# Patient Record
Sex: MALE | Race: WHITE | NOT HISPANIC OR LATINO | Employment: FULL TIME | URBAN - METROPOLITAN AREA
[De-identification: names, ages, dates, MRNs, and addresses within clinical notes are randomized per-mention and may not be internally consistent; named-entity substitution may affect disease eponyms.]

---

## 2019-10-10 ENCOUNTER — HOSPITAL ENCOUNTER (OUTPATIENT)
Dept: MEDSURG UNIT | Facility: HOSPITAL | Age: 49
End: 2019-10-11
Attending: FAMILY MEDICINE | Admitting: FAMILY MEDICINE

## 2019-10-10 LAB
ABS NEUT (OLG): 3.9 X10(3)/MCL (ref 1.5–6.9)
ALBUMIN SERPL-MCNC: 3.8 GM/DL (ref 3.4–5)
ALBUMIN/GLOB SERPL: 1.3 RATIO
ALP SERPL-CCNC: 72 UNIT/L (ref 30–113)
ALT SERPL-CCNC: 23 UNIT/L (ref 10–45)
AMPHET UR QL SCN: NORMAL
APTT PPP: 31 SECOND(S) (ref 25–35)
AST SERPL-CCNC: 20 UNIT/L (ref 15–37)
BARBITURATE SCN PRESENT UR: NORMAL
BASOPHILS # BLD AUTO: 0 X10(3)/MCL (ref 0–0.1)
BASOPHILS NFR BLD AUTO: 0 % (ref 0–1)
BENZODIAZ UR QL SCN: NORMAL
BILIRUB SERPL-MCNC: 0.5 MG/DL (ref 0.1–0.9)
BILIRUBIN DIRECT+TOT PNL SERPL-MCNC: 0.1 MG/DL (ref 0–0.3)
BILIRUBIN DIRECT+TOT PNL SERPL-MCNC: 0.4 MG/DL
BUN SERPL-MCNC: 15 MG/DL (ref 10–20)
CALCIUM SERPL-MCNC: 8.4 MG/DL (ref 8–10.5)
CANNABINOIDS UR QL SCN: NORMAL
CHLORIDE SERPL-SCNC: 101 MMOL/L (ref 100–108)
CO2 SERPL-SCNC: 26 MMOL/L (ref 21–35)
COCAINE UR QL SCN: NORMAL
CREAT SERPL-MCNC: 0.59 MG/DL (ref 0.7–1.3)
EOSINOPHIL # BLD AUTO: 0.1 X10(3)/MCL (ref 0–0.6)
EOSINOPHIL NFR BLD AUTO: 2 % (ref 0–5)
ERYTHROCYTE [DISTWIDTH] IN BLOOD BY AUTOMATED COUNT: 12.6 % (ref 11.5–17)
GLOBULIN SER-MCNC: 2.9 GM/DL
GLUCOSE SERPL-MCNC: 89 MG/DL (ref 75–116)
HCT VFR BLD AUTO: 42.6 % (ref 42–52)
HGB BLD-MCNC: 15.3 GM/DL (ref 14–18)
IMM GRANULOCYTES # BLD AUTO: 0.01 10*3/UL (ref 0–0.02)
IMM GRANULOCYTES NFR BLD AUTO: 0.1 % (ref 0–0.43)
INR PPP: 1 (ref 0–1.2)
LYMPHOCYTES # BLD AUTO: 2.7 X10(3)/MCL (ref 0.5–4.1)
LYMPHOCYTES NFR BLD AUTO: 37 % (ref 15–40)
MAGNESIUM SERPL-MCNC: 1.9 MG/DL (ref 1.8–2.4)
MCH RBC QN AUTO: 32 PG (ref 27–34)
MCHC RBC AUTO-ENTMCNC: 36 GM/DL (ref 31–36)
MCV RBC AUTO: 89 FL (ref 80–99)
MDMA UR QL SCN: NORMAL
METHADONE UR QL SCN: NORMAL
MONOCYTES # BLD AUTO: 0.6 X10(3)/MCL (ref 0–1.1)
MONOCYTES NFR BLD AUTO: 8 % (ref 4–12)
NEUTROPHILS # BLD AUTO: 3.9 X10(3)/MCL (ref 1.5–6.9)
NEUTROPHILS NFR BLD AUTO: 53 % (ref 43–75)
OPIATES UR QL SCN: NORMAL
PCP UR QL: NORMAL
PH UR STRIP.AUTO: 6 [PH] (ref 5–8)
PHOSPHATE SERPL-MCNC: 2.7 MG/DL (ref 2.6–4.7)
PLATELET # BLD AUTO: 209 X10(3)/MCL (ref 140–400)
PMV BLD AUTO: 8.8 FL (ref 6.8–10)
POTASSIUM SERPL-SCNC: 3.3 MMOL/L (ref 3.6–5.2)
PROT SERPL-MCNC: 6.7 GM/DL (ref 6.4–8.2)
PROTHROMBIN TIME: 10.5 SECOND(S) (ref 9–12)
RBC # BLD AUTO: 4.78 X10(6)/MCL (ref 4.7–6.1)
SODIUM SERPL-SCNC: 138 MMOL/L (ref 135–145)
TEMPERATURE, URINE (OHS): 25 DEGC (ref 20–25)
WBC # SPEC AUTO: 7.4 X10(3)/MCL (ref 4.5–11.5)

## 2019-10-11 LAB
ABS NEUT (OLG): 3.8 X10(3)/MCL (ref 1.5–6.9)
ALBUMIN SERPL-MCNC: 3.5 GM/DL (ref 3.4–5)
ALBUMIN/GLOB SERPL: 1.3 RATIO
ALP SERPL-CCNC: 66 UNIT/L (ref 30–113)
ALT SERPL-CCNC: 20 UNIT/L (ref 10–45)
AST SERPL-CCNC: 18 UNIT/L (ref 15–37)
BASOPHILS # BLD AUTO: 0 X10(3)/MCL (ref 0–0.1)
BASOPHILS NFR BLD AUTO: 0 % (ref 0–1)
BILIRUB SERPL-MCNC: 0.6 MG/DL (ref 0.1–0.9)
BILIRUBIN DIRECT+TOT PNL SERPL-MCNC: 0.1 MG/DL (ref 0–0.3)
BILIRUBIN DIRECT+TOT PNL SERPL-MCNC: 0.5 MG/DL
BUN SERPL-MCNC: 14 MG/DL (ref 10–20)
CALCIUM SERPL-MCNC: 8.6 MG/DL (ref 8–10.5)
CHLORIDE SERPL-SCNC: 101 MMOL/L (ref 100–108)
CO2 SERPL-SCNC: 31 MMOL/L (ref 21–35)
CREAT SERPL-MCNC: 0.64 MG/DL (ref 0.7–1.3)
EOSINOPHIL # BLD AUTO: 0.2 X10(3)/MCL (ref 0–0.6)
EOSINOPHIL NFR BLD AUTO: 2 % (ref 0–5)
ERYTHROCYTE [DISTWIDTH] IN BLOOD BY AUTOMATED COUNT: 12.8 % (ref 11.5–17)
GLOBULIN SER-MCNC: 2.7 GM/DL
GLUCOSE SERPL-MCNC: 93 MG/DL (ref 75–116)
HCT VFR BLD AUTO: 43.7 % (ref 42–52)
HGB BLD-MCNC: 15.1 GM/DL (ref 14–18)
IMM GRANULOCYTES # BLD AUTO: 0.01 10*3/UL (ref 0–0.02)
IMM GRANULOCYTES NFR BLD AUTO: 0.1 % (ref 0–0.43)
LYMPHOCYTES # BLD AUTO: 2.4 X10(3)/MCL (ref 0.5–4.1)
LYMPHOCYTES NFR BLD AUTO: 34 % (ref 15–40)
MAGNESIUM SERPL-MCNC: 2.3 MG/DL (ref 1.8–2.4)
MCH RBC QN AUTO: 31 PG (ref 27–34)
MCHC RBC AUTO-ENTMCNC: 35 GM/DL (ref 31–36)
MCV RBC AUTO: 91 FL (ref 80–99)
MONOCYTES # BLD AUTO: 0.7 X10(3)/MCL (ref 0–1.1)
MONOCYTES NFR BLD AUTO: 9 % (ref 4–12)
NEUTROPHILS # BLD AUTO: 3.8 X10(3)/MCL (ref 1.5–6.9)
NEUTROPHILS NFR BLD AUTO: 54 % (ref 43–75)
PHOSPHATE SERPL-MCNC: 3.3 MG/DL (ref 2.6–4.7)
PLATELET # BLD AUTO: 210 X10(3)/MCL (ref 140–400)
PMV BLD AUTO: 9.3 FL (ref 6.8–10)
POTASSIUM SERPL-SCNC: 3.3 MMOL/L (ref 3.6–5.2)
PROT SERPL-MCNC: 6.2 GM/DL (ref 6.4–8.2)
RBC # BLD AUTO: 4.82 X10(6)/MCL (ref 4.7–6.1)
SODIUM SERPL-SCNC: 139 MMOL/L (ref 135–145)
WBC # SPEC AUTO: 7 X10(3)/MCL (ref 4.5–11.5)

## 2022-02-11 ENCOUNTER — HISTORICAL (OUTPATIENT)
Dept: LAB | Facility: HOSPITAL | Age: 52
End: 2022-02-11

## 2022-02-11 LAB
BUN SERPL-MCNC: 10 MG/DL (ref 8.9–20.6)
CALCIUM SERPL-MCNC: 9.2 MG/DL (ref 8.7–10.5)
CHLORIDE SERPL-SCNC: 103 MMOL/L (ref 98–107)
CO2 SERPL-SCNC: 29 MMOL/L (ref 22–29)
CREAT SERPL-MCNC: 0.74 MG/DL (ref 0.73–1.18)
CREAT/UREA NIT SERPL: 14
GLUCOSE SERPL-MCNC: 96 MG/DL (ref 74–100)
HEMOLYSIS INTERF INDEX SERPL-ACNC: 10
ICTERIC INTERF INDEX SERPL-ACNC: 1
LIPEMIC INTERF INDEX SERPL-ACNC: 3
LITHIUM SERPL-MCNC: 0.52 UG/ML (ref 0.5–1.2)
POTASSIUM SERPL-SCNC: 4.4 MMOL/L (ref 3.5–5.1)
SODIUM SERPL-SCNC: 140 MMOL/L (ref 136–145)

## 2022-04-04 ENCOUNTER — HISTORICAL (OUTPATIENT)
Dept: LAB | Facility: HOSPITAL | Age: 52
End: 2022-04-04

## 2022-04-04 LAB
BUN SERPL-MCNC: 10 MG/DL (ref 8.9–20.6)
CALCIUM SERPL-MCNC: 9.3 MG/DL (ref 8.7–10.5)
CHLORIDE SERPL-SCNC: 102 MMOL/L (ref 98–107)
CO2 SERPL-SCNC: 30 MMOL/L (ref 22–29)
CREAT SERPL-MCNC: 0.75 MG/DL (ref 0.73–1.18)
CREAT/UREA NIT SERPL: 13
GLUCOSE SERPL-MCNC: 95 MG/DL (ref 74–100)
HEMOLYSIS INTERF INDEX SERPL-ACNC: 6
ICTERIC INTERF INDEX SERPL-ACNC: 1
LIPEMIC INTERF INDEX SERPL-ACNC: 2
LITHIUM SERPL-MCNC: 0.54 UG/ML (ref 0.5–1.2)
POTASSIUM SERPL-SCNC: 4.1 MMOL/L (ref 3.5–5.1)
SODIUM SERPL-SCNC: 141 MMOL/L (ref 136–145)

## 2022-04-29 NOTE — DISCHARGE SUMMARY
Patient:   Austin Kaplan            MRN: [id number]            FIN: [id number]               Age:   48 years     Sex:  Male     :  1970   Associated Diagnoses:   HTN - Hypertension; Bipolar disorder   Author:   Matthew GAY, Mike ARTEAGA      Results Review   General results   Today's results   10/11/2019 6:15 CDT      Preferred Pain Tool       Numeric rating scale                             Numeric Rating at Rest    2    10/11/2019 6:06 CDT      Environmental Safety Implemented          Adequate room lighting, Bed in low position, Call device within reach, Encourage handrail/safety bar use, Patient specific safety measures, Personal items within reach, Traffic path in room free of clutter, Upper/Half length side rails for bed mobility, Wheels locked    10/11/2019 6:05 CDT      Hand Left 20 gauge                                       Peripheral IV Activity:               Assess                                 Peripheral IV Site Condition:         No complications                                 Peripheral IV Drainage Description:   None                                 Peripheral IV Dressing:               Dry, Intact, Transparent                                 Peripheral IV Patency:                No complications                                 Peripheral IV Equipment:              Saline lock    10/11/2019 6:04 CDT      Continuous Visual Observation             N/A                             Patient Location          Patient's room                             Patient Visitors          None                             Patient Safety Measures in Use            All items within reach                             Patient Position          [employer]'s                             Elimination Assistance Offered Q2H        Independent    10/11/2019 6:00 CDT      Pain Present              No actual or suspected pain    10/11/2019 4:30 CDT      Sodium Lvl                139 mmol/L                              Potassium Lvl             3.3 mmol/L  LOW                             Chloride                  101 mmol/L                             CO2                       31 mmol/L                             Calcium Lvl               8.6 mg/dL                             Magnesium Lvl             2.3 mg/dL                             Glucose Lvl               93 mg/dL                             BUN                       14 mg/dL                             Creatinine                0.64 mg/dL  LOW                             eGFR-AA                   >60 mL/min/1.73 m2  NA                             eGFR-ALMA                  >60 mL/min/1.73 m2  NA                             Bili Total                0.6 mg/dL                             Bili Direct               0.10 mg/dL                             Bili Indirect             0.50 mg/dL  NA                             AST                       18 unit/L                             ALT                       20 unit/L                             Alk Phos                  66 unit/L                             Total Protein             6.2 gm/dL  LOW                             Albumin Lvl               3.5 gm/dL                             Globulin                  2.70 gm/dL  NA                             A/G Ratio                 1.3 ratio  NA                             Phosphorus                3.3 mg/dL                             WBC                       7.0 x10(3)/mcL                             RBC                       4.82 x10(6)/mcL                             Hgb                       15.1 gm/dL                             Hct                       43.7 %                             Platelet                  210 x10(3)/mcL                             MCV                       91 fL                             MCH                       31 pg                             MCHC                      35 gm/dL                             RDW                       12.8  %                             MPV                       9.3 fL                             Abs Neut                  3.8 x10(3)/mcL                             Neutro Auto               54 %                             Lymph Auto                34 %                             Mono Auto                 9 %                             Eos Auto                  2 %                             Abs Eos                   0.2 x10(3)/mcL                             Basophil Auto             0 %                             Abs Neutro                3.8 x10(3)/mcL                             Abs Lymph                 2.4 x10(3)/mcL                             Abs Mono                  0.7 x10(3)/mcL                             Abs Baso                  0.0 x10(3)/mcL                             IG%                       0.100 %                             IG#                       0.0100    10/11/2019 4:27 CDT      Pain Present              No actual or suspected pain                             Environmental Safety Implemented          Adequate room lighting, Bed in low position, Call device within reach, Encourage handrail/safety bar use, Patient specific safety measures, Personal items within reach, Traffic path in room free of clutter, Upper/Half length side rails for bed mobility, Wheels locked    10/11/2019 4:26 CDT      Heart Rate Monitored      82 bpm                             Continuous Visual Observation             N/A                             Patient Location          Patient's room                             Patient Visitors          None                             Patient Safety Measures in Use            All items within reach                             Trauma - Neuro Reassessment               Yes - No Neuro Changes                             Cardiac Monitor Type      Telemetry                             Monitoring Lead           II                             Telemetry Monitor Serial Number            8                             Cardiac Rhythm            Sinus Rhythm                             Patient Position          Semi-Sellers's                             Elimination Assistance Offered Q2H        Independent    10/11/2019 4:25 CDT      Hand Left 20 gauge                                       Peripheral IV Activity:               Assess                                 Peripheral IV Site Condition:         No complications                                 Peripheral IV Drainage Description:   None                                 Peripheral IV Dressing:               Dry, Intact, Transparent                                 Peripheral IV Patency:                No complications                                 Peripheral IV Equipment:              Saline lock    10/11/2019 4:00 CDT      Systolic Blood Pressure   142 mmHg  HI                             Diastolic Blood Pressure  89 mmHg                             Monitor Assessment        In progress                             Monitoring Devices        Cardiac                             Monitoring Alarms On      Cardiac    10/11/2019 3:00 CDT      Environmental Safety Implemented          Adequate room lighting                             Continuous Visual Observation             N/A                             Patient Location          Patient's room                             Patient Visitors          None                             Patient Safety Measures in Use            All items within reach                             Pulse Oximetry Multiple Measurement       1                             Patient Position          Sleeping                             Elimination Assistance Offered Q2H        Independent    10/11/2019 2:37 CDT      Environmental Safety Implemented          Adequate room lighting, Bed in low position, Call device within reach, Encourage handrail/safety bar use, Patient specific safety measures, Personal items within reach, Traffic  path in room free of clutter, Upper/Half length side rails for bed mobility, Wheels locked    10/11/2019 2:36 CDT      Hand Left 20 gauge                                       Peripheral IV Activity:               Assess                                 Peripheral IV Site Condition:         No complications                                 Peripheral IV Drainage Description:   None                                 Peripheral IV Dressing:               Dry, Intact, Transparent                                 Peripheral IV Patency:                No complications                                 Peripheral IV Equipment:              Saline lock    10/11/2019 2:35 CDT      Continuous Visual Observation             N/A                             Patient Location          Patient's room                             Patient Visitors          None                             Patient Safety Measures in Use            All items within reach                             Patient Position          [employer]ellers's                             Elimination Assistance Offered Q2H        Independent    10/11/2019 2:00 CDT      Pain Present              No actual or suspected pain    10/11/2019 1:00 CDT      Environmental Safety Implemented          Adequate room lighting                             Continuous Visual Observation             N/A                             Patient Location          Patient's room                             Patient Visitors          Family at bedside                             Patient Safety Measures in Use            All items within reach                             Patient Position          Sleeping                             Elimination Assistance Offered Q2H        Independent    10/11/2019 0:02 CDT      Environmental Safety Implemented          Adequate room lighting, Bed in low position, Call device within reach, Encourage handrail/safety bar use, Patient specific safety measures, Personal items  within reach, Traffic path in room free of clutter, Upper/Half length side rails for bed mobility, Wheels locked    10/11/2019 0:01 CDT      Continuous Visual Observation             N/A                             Patient Location          Patient's room                             Patient Visitors          None                             Patient Safety Measures in Use            All items within reach                             Patient Position          [employer]'s                             Elimination Assistance Offered Q2H        Independent    10/11/2019 0:00 CDT      Heart Rate Monitored      88 bpm                             Monitor Assessment        In progress                             Monitoring Devices        Cardiac                             Monitoring Alarms On      Cardiac                             Pain Present              No actual or suspected pain                             Trauma - Neuro Reassessment               Yes - No Neuro Changes                             Cardiac Monitor Type      Telemetry                             Monitoring Lead           II                             Telemetry Monitor Serial Number           8                             Cardiac Rhythm            Sinus Rhythm                             Hand Left 20 gauge                                       Peripheral IV Activity:               Assess                                 Peripheral IV Site Condition:         No complications                                 Peripheral IV Drainage Description:   None                                 Peripheral IV Dressing:               Dry, Intact, Transparent                                 Peripheral IV Patency:                No complications                                 Peripheral IV Equipment:              Saline lock    10/10/2019 23:10 CDT     Temperature Oral          36.7 DegC                             Temperature Oral (calculated)             98.06 DegF                              Heart Rate Monitored      85 bpm                             Respiratory Rate          18 br/min                             SpO2                      96 %                             Systolic Blood Pressure   147 mmHg  HI                             Diastolic Blood Pressure  75 mmHg                             Mean Arterial Pressure, Cuff              99 mmHg    10/10/2019 23:00 CDT     U pH                      6.0                             U Temp                    25.0 DegC                             U Amph Scr                NEG.                             U Jadyn Scr                NEG.                             U Benzodia Scr            NEG.                             U Cannab Scr              NEG.                             U Cocaine Scr             NEG.                             U Opiate Scr              NEG.                             U Phencyclidine Scr       NEG.                             U Methadone               NEG.                             U MDMA Scr                NEG.                             Environmental Safety Implemented          Adequate room lighting                             Continuous Visual Observation             N/A                             Patient Location          Patient's room                             Patient Visitors          Family at bedside                             Patient Safety Measures in Use            All items within reach                             Patient Position          Sleeping                             Elimination Assistance Offered Q2H        Independent    10/10/2019 22:11 CDT     Environmental Safety Implemented          Adequate room lighting, Bed in low position, Call device within reach, Encourage handrail/safety bar use, Patient specific safety measures, Personal items within reach, Traffic path in room free of clutter, Upper/Half length side rails for bed mobility, Wheels locked    10/10/2019 22:10 CDT      Hand Left 20 gauge                                       Peripheral IV Activity:               Assess                                 Peripheral IV Site Condition:         No complications                                 Peripheral IV Drainage Description:   None                                 Peripheral IV Dressing:               Dry, Intact, Transparent                                 Peripheral IV Patency:                No complications                                 Peripheral IV Equipment:              Saline lock    10/10/2019 22:09 CDT     Continuous Visual Observation             N/A                             Patient Location          Patient's room                             Patient Visitors          None                             Patient Safety Measures in Use            All items within reach                             Patient Position          [facility]'s                             Elimination Assistance Offered Q2H        Independent    10/10/2019 22:00 CDT     Pain Present              No actual or suspected pain    10/10/2019 21:00 CDT     Environmental Safety Implemented          Adequate room lighting                             Continuous Visual Observation             N/A                             Patient Location          Patient's room                             Patient Visitors          Family at bedside                             Patient Safety Measures in Use            All items within reach                             Patient Position          [employer]'s                             Elimination Assistance Offered Q2H        Independent    10/10/2019 20:01 CDT     Environmental Safety Implemented          Adequate room lighting, Bed in low position, Call device within reach, Encourage handrail/safety bar use, Patient specific safety measures, Personal items within reach, Traffic path in room free of clutter, Upper/Half length side rails for bed mobility, Wheels locked     10/10/2019 20:00 CDT     Temperature Oral          36.6 DegC                             Temperature Oral (calculated)             97.88 DegF                             Peripheral Pulse Rate     103 bpm  HI                             Heart Rate Monitored      In Error bpm  (In Error)                            Respiratory Rate          20 br/min                             SpO2                      95 %                             Oxygen Therapy            Room air                             Systolic Blood Pressure   164 mmHg  HI                             Diastolic Blood Pressure  93 mmHg  HI                             Monitor Assessment        In progress                             Monitoring Devices        Cardiac                             Monitoring Alarms On      Cardiac                             Pain Present              Yes actual or suspected pain                             Preferred Pain Tool       Numeric rating scale                             Numeric Rating at Rest    5 = Moderate pain                             Primary Pain Location     Head                             Primary Pain Laterality   Bilateral                             Primary Pain Time Pattern Intermittent                             Primary Pain Quality      Aching                             Pain Interventions        Medications                             Continuous Visual Observation             N/A                             Patient Location          Patient's room                             Patient Visitors          Family at bedside                             Patient Safety Measures in Use            All items within reach                             DVT Prophylaxis           Enoxaparin                             DVT SCD Excluding Factors None                             DVT Age                   40-60                             DVT Procedures            Not applicable                             DVT Disease  Processess    Not applicable                             DVT Lifestyle             Not applicable                             DVT Risk Score            1                             Respiratory Symptoms      None                             Respirations              Unlabored, Quiet                             Respiratory Pattern       Regular                             All Lobes Breath Sounds   Clear                             Cough                     None                             Nail Bed Color            Pink                             Nail Bed Description Left Hand            Pink                             Nail Bed Description Right Hand           Pink                             Nail Bed Description Left Foot            Pink                             Nail Bed Description Right Foot           Pink                             Clubbing Present          No                             Capillary Refill          Less than 2 seconds                             Capillary Refill Left Hand                Less than 2 seconds                             Capillary Refill Right Hand               Less than 2 seconds                             Capillary Refill Left Foot                Less than 2 seconds                             Capillary Refill Right Foot               Less than 2 seconds                             Heart Rhythm              Regular                             Radial Pulses Bilateral   2+ Normal                             Dorsalis Pedis Bilateral  2+ Normal                             Edema                     None                             Cardiac Monitor Type      Telemetry                             Monitoring Lead           II                             Telemetry Monitor Serial Number           8                             Cardiac Rhythm            Sinus Rhythm                             Level of Consciousness    Alert                             Loss of Consciousness     No                              Affect/Behavior           Appropriate, Calm, Cooperative                             Characteristics of Communication          Appropriate                             Characteristics of Speech Clear                             Hallucinations Present    None                             Gait                      Steady                             Movement of Extremities   Lower extremity equal, Upper extremity equal                             Aspiration Risk           None                             PERRLA                    Yes                             Tone, Left Upper Extremity                Normal                             Tone, Right Upper Extremity               Normal                             Tone, Left Lower Extremity                Normal                             Tone, Right Lower Extremity               Normal                             Left Upper Extremity Sensation            Intact                             Right Upper Extremity Sensation           Intact                             Left Lower Extremity Sensation            Intact                             Right Lower Extremity Sensation           Intact                             LUE, Follows Commands     Yes                             RUE, Follows Commands     Yes                             LLE, Follows Commands     Yes                             RLE, Follows Commands     Yes                             Stressors                 Diagnosis, Hospitalization                             Suicidal Ideation         None                             Suicide Plan Formulated   Denies                             Orientation Assessment    Oriented x 4                             Support Person            Present                             Support Person Involvement                Supportive/involved                             Support Person Coping     Neisha with stressors                             Support Person  Interaction w/ Care Team   Appropriately interacts with health care team                             Patient Coping            Neisha with stressors                             Patient Interaction w/Healthcare Team     Appropriately interacts with health care team                             Patient feelings/concerns Discusses care, feelings, concerns                             Abdomen Description       Rounded, Symmetric                             Abdomen Palpation         Soft, Non-Tender                             Passing Flatus            Yes                             Bowel Sounds All Quadrants                Present                             Urinary Elimination       Voiding, no difficulties                             Skin Color General        Flushed                             Skin Temperature          Warm                             Skin Moisture General     Dry                             Skin Integrity General    Intact                             Skin Turgor General       Elastic                             Mucous Membrane Color     Pink                             Mucous Membrane Description               Moist                             Left Upper Extremity Description          Pink                             Right Upper Extremity Description         Pink                             Left Lower Extremity Description          Pink                             Right Lower Extremity Description         Pink                             Temperature Left Upper Extremity          Warm                             Temperature Right Upper Extremity         Warm                             Skin Temperature, Upper Extremities       Warm                             Temperature Left Lower Extremity          Warm                             Temperature Right Lower Extremity         Warm                             Skin Temperature, Lower Extremities       Warm                             Sensory Perception  Joaquin No impairment                             Moisture Joaquin           Rarely moist                             Activity Joaquin           Walks frequently                             Mobility Joaquin           No limitations                             Nutrition Joaquin          Excellent                             Friction and Shear Joaquin No apparent problem                             Joaquin Score              23                             Positioning Aids          Pillow                             Positioning Details       Self positioning                             Hand Left 20 gauge                                       Peripheral IV Activity:               Assess                                 Peripheral IV Site Condition:         No complications                                 Peripheral IV Drainage Description:   None                                 Peripheral IV Dressing:               Dry, Intact, Transparent                                 Peripheral IV Patency:                No complications                                 Peripheral IV Equipment:              Saline lock                             Patient Position          Semi-Sellers's                             Elimination Assistance Offered Q2H        Independent                             History of Fall in Last 3 Months Wilkinson    No                             Presence of Secondary Diagnosis Wilkinson     Yes                             Use of Ambulatory Aid Wilkinson               None, bedrest, wheelchair, nurse                             IV/Heparin Lock Fall Risk Wilkinson           Yes                             Gait Weak or Impaired Fall Risk Wilkinson     Normal, bedrest, immobile                             Mental Status Fall Risk Wilkinson             Oriented to own ability                             Wilkinson Fall Risk Score     35                             Appetite                  Good    10/10/2019 19:00 CDT     Environmental Safety  Implemented          Adequate room lighting                             Continuous Visual Observation             N/A                             Patient Location          Patient's room                             Patient Visitors          Family at bedside                             Patient Safety Measures in Use            All items within reach                             Patient Position          Semi-Sellers's                             Elimination Assistance Offered Q2H        Independent    10/10/2019 18:50 CDT     Sodium Lvl                138 mmol/L                             Potassium Lvl             3.3 mmol/L  LOW                             Chloride                  101 mmol/L                             CO2                       26 mmol/L                             Calcium Lvl               8.4 mg/dL                             Magnesium Lvl             1.9 mg/dL                             Glucose Lvl               89 mg/dL                             BUN                       15 mg/dL                             Creatinine                0.59 mg/dL  LOW                             eGFR-AA                   >60 mL/min/1.73 m2  NA                             eGFR-ALMA                  >60 mL/min/1.73 m2  NA                             Bili Total                0.5 mg/dL                             Bili Direct               0.10 mg/dL                             Bili Indirect             0.40 mg/dL  NA                             AST                       20 unit/L                             ALT                       23 unit/L                             Alk Phos                  72 unit/L                             Total Protein             6.7 gm/dL                             Albumin Lvl               3.8 gm/dL                             Globulin                  2.90 gm/dL  NA                             A/G Ratio                 1.3 ratio  NA                             Phosphorus                 2.7 mg/dL                             PT                        10.5 second(s)                             INR                       1.0                             PTT                       31.0 second(s)                             WBC                       7.4 x10(3)/mcL                             RBC                       4.78 x10(6)/mcL                             Hgb                       15.3 gm/dL                             Hct                       42.6 %                             Platelet                  209 x10(3)/mcL                             MCV                       89 fL                             MCH                       32 pg                             MCHC                      36 gm/dL                             RDW                       12.6 %                             MPV                       8.8 fL                             Abs Neut                  3.9 x10(3)/mcL                             Neutro Auto               53 %                             Lymph Auto                37 %                             Mono Auto                 8 %                             Eos Auto                  2 %                             Abs Eos                   0.1 x10(3)/mcL                             Basophil Auto             0 %                             Abs Neutro                3.9 x10(3)/mcL                             Abs Lymph                 2.7 x10(3)/mcL                             Abs Mono                  0.6 x10(3)/mcL                             Abs Baso                  0.0 x10(3)/mcL                             IG%                       0.100 %                             IG#                       0.0100    10/10/2019 18:28 CDT     Weight Dosing             102 kg                             Weight Measured           102 kg                             Weight Measured and Calculated in Lbs     224.87 lb                             Height/Length Dosing      177 cm                              Height/Length Measured    177 cm                             Weight Loss Surgery History               No                             Suicide Plan Formulated   Denies                             Pregnancy Status          N/A                             Lines or Tubes Present on Admission       None                             Translation Needed        No                             Advance Directive         No                             Adult Patient History - Text              Adult Patient History ECD    10/10/2019 18:15 CDT     Systolic Blood Pressure   181 mmHg  HI                             Diastolic Blood Pressure  120 mmHg  HI                             Numeric Rating at Rest    0 = No pain    10/10/2019 18:12 CDT     Cardiovascular Interpretation             Interpretation  (Unauth)                            MUSEWebURL                MUSEWebURL  (Unauth)                            Ventricular Rate ECG      88 bpm  (Unauth)                            Atrial Rate ECG           88 bpm  (Unauth)                            PA Interval ECG           180 msec  (Unauth)                            QRS Duration ECG          74 msec  (Unauth)                            QT Interval ECG           358 msec  (Unauth)                            QTC Calculation           433 msec  (Unauth)                            P Axis                    -9 degrees  (Unauth)                            R Axis                    49 degrees  (Unauth)                            T Axis                    23 degrees  (Unauth)                            Electrocardiogram-EKG     Electrocardiogram-EKG  (Transcribed)   10/10/2019 18:00 CDT     Temperature Axillary      36.7 DegC                             Temperature Axillary (calculated)         98.06 DegF                             Peripheral Pulse Rate     94 bpm                             SpO2                      96 %                             Oxygen Therapy             Room air                             Systolic Blood Pressure   173 mmHg  HI                             Diastolic Blood Pressure  112 mmHg  HI                             Pain Present              No actual or suspected pain                             DVT Prophylaxis           Enoxaparin                             DVT SCD Excluding Factors None                             DVT Age                   40-60                             DVT Procedures            Not applicable                             DVT Disease Processess    Not applicable                             DVT Lifestyle             Not applicable                             DVT Risk Score            1                             Respiratory Symptoms      None                             Respirations              Unlabored                             Respiratory Pattern       Regular                             All Lobes Breath Sounds   Clear                             Nail Bed Color            Pink                             Capillary Refill          Less than 2 seconds                             Heart Rhythm              Regular                             Edema                     None                             Level of Consciousness    Alert                             Affect/Behavior           Appropriate, Calm, Cooperative                             Characteristics of Communication          Appropriate                             Characteristics of Speech Clear                             Gait                      Steady                             Orientation Assessment    Oriented x 4                             Abdomen Description       Rounded                             Abdomen Palpation         Soft                             Bowel Sounds All Quadrants                Present                             Urinary Elimination       Voiding, no difficulties                             Skin Color General        Flushed                              Skin Temperature          Warm                             Skin Moisture General     Dry                             Skin Integrity General    Intact                             Skin Turgor General       Elastic                             Mucous Membrane Color     Pink                             Mucous Membrane Description               Moist                             Sensory Perception Joaquin No impairment                             Moisture Joaquin           Rarely moist                             Activity Joaquin           Walks frequently                             Mobility Joaquin           No limitations                             Nutrition Joaquin          Excellent                             Friction and Shear Joaquin No apparent problem                             Joaquin Score              23                             Hand Left 20 gauge                                       Peripheral IV Activity:               Start                                 Peripheral IV Number of Attempts:     1                                 Peripheral IV Dressing:               Dry, Intact, Transparent                                 Peripheral IV Patency:                No complications                                 Peripheral IV Equipment:              Saline lock                             History of Fall in Last 3 Months Wilkinson    No                             Presence of Secondary Diagnosis Wilkinson     No                             Use of Ambulatory Aid Wilkinson               None, bedrest, wheelchair, nurse                             IV/Heparin Lock Fall Risk Wilkinson           Yes                             Gait Weak or Impaired Fall Risk Wilkinson     Normal, bedrest, immobile                             Mental Status Fall Risk Wilkinson             Oriented to own ability                             Wilkinson Fall Risk Score     20                             Appetite                  Good                              Electrocardiogram         Done        Discharge Information      Discharge Summary Information   Admitted  10/10/2019   Discharged  10/11/2019   Discharge diagnosis     HTN - Hypertension ([id number] I10).     Bipolar disorder ([id number] F31.9).        Physical Examination   General:  Alert and oriented, No acute distress.    Eye:  Pupils are equal, round and reactive to light, Extraocular movements are intact, Normal conjunctiva, Vision unchanged.    HENT:  Normocephalic, Tympanic membranes are clear, Normal hearing, Oral mucosa is moist, No pharyngeal erythema.    Neck:  Supple, Non-tender, No carotid bruit, No jugular venous distention, No lymphadenopathy, No thyromegaly.    Respiratory:  Lungs are clear to auscultation, Respirations are non-labored, Breath sounds are equal, Symmetrical chest wall expansion, No chest wall tenderness.    Cardiovascular:  Normal rate, Regular rhythm, No murmur, No gallop, Good pulses equal in all extremities, Normal peripheral perfusion, No edema.    Gastrointestinal:  Soft, Non-tender, Non-distended, Normal bowel sounds, No organomegaly.    Genitourinary:  No costovertebral angle tenderness, No scrotal tenderness, No inguinal tenderness, No urethral discharge.    Vital Signs   10/11/2019 4:26 CDT      Heart Rate Monitored      82 bpm    10/11/2019 4:00 CDT      Systolic Blood Pressure   142 mmHg  HI                             Diastolic Blood Pressure  89 mmHg    10/11/2019 0:00 CDT      Heart Rate Monitored      88 bpm    10/10/2019 23:10 CDT     Temperature Oral          36.7 DegC                             Temperature Oral (calculated)             98.06 DegF                             Heart Rate Monitored      85 bpm                             Respiratory Rate          18 br/min                             SpO2                      96 %                             Systolic Blood Pressure   147 mmHg  HI                             Diastolic Blood Pressure  75 mmHg                              Mean Arterial Pressure, Cuff              99 mmHg    10/10/2019 20:00 CDT     Temperature Oral          36.6 DegC                             Temperature Oral (calculated)             97.88 DegF                             Peripheral Pulse Rate     103 bpm  HI                             Heart Rate Monitored      In Error bpm  (In Error)                            Respiratory Rate          20 br/min                             SpO2                      95 %                             Oxygen Therapy            Room air                             Systolic Blood Pressure   164 mmHg  HI                             Diastolic Blood Pressure  93 mmHg  HI    10/10/2019 18:15 CDT     Systolic Blood Pressure   181 mmHg  HI                             Diastolic Blood Pressure  120 mmHg  HI    10/10/2019 18:00 CDT     Temperature Axillary      36.7 DegC                             Temperature Axillary (calculated)         98.06 DegF                             Peripheral Pulse Rate     94 bpm                             SpO2                      96 %                             Oxygen Therapy            Room air                             Systolic Blood Pressure   173 mmHg  HI                             Diastolic Blood Pressure  112 mmHg  HI        Vital Signs (last 24 hrs)_____  Last Charted___________  Temp Oral     36.7 DegC  (OCT 10 23:10)  Heart Rate Peripheral   H 103bpm  (OCT 10 20:00)  Resp Rate         18 br/min  (OCT 10 23:10)  SBP      H 142mmHg  (OCT 11 04:00)  DBP      89 mmHg  (OCT 11 04:00)  SpO2      96 %  (OCT 10 23:10)  Weight      102 kg  (OCT 10 18:28)  Height      177 cm  (OCT 10 18:28)     Measurements from flowsheet : Measurements   10/10/2019 18:28 CDT     Weight Dosing             102 kg                             Weight Measured           102 kg                             Weight Measured and Calculated in Lbs     224.87 lb                             Height/Length Dosing      177 cm                              Height/Length Measured    177 cm     Lymphatics:  No lymphadenopathy neck, axilla, groin.    Musculoskeletal:  Normal range of motion, Normal strength, No tenderness, No swelling, No deformity, Normal gait.    Integumentary:  Warm, Dry, Pink, Intact, No pallor, No rash.    Neurologic:  Alert, Oriented, Normal sensory, Normal motor function, No focal deficits, Cranial Nerves II-XII are grossly intact, Gag reflex normal, Normal deep tendon reflexes.    Psychiatric:  Cooperative, Appropriate mood & affect, Normal judgment, Non-suicidal.       Hospital Course   38-year-old  male well-known to me through clinic admitted for direct admit yesterday for hypertensive crisis with blood pressure 220/120.  Was placed on nitroglycerin patch last night as well as PRN hydralazine and I initiated lisinopril hydrochlorothiazide.  Also held his Effexor which is known to cause hypertension.  Proved through the night after his initial dose of hydralazine he is trended down and is currently at 140/92.  He has never had any chest pain or shortness of breath he had a headache initially which has cleared.  He is currently at or better than his baseline.  Motion patch was discontinued through the night and the patient maintains his pressure.      Discharge Plan   Discharge patient home in stable condition today.    He will continue his current home medications with the exception of discontinuing his Effexor in its place we will give him S-Citalopram 10 mg a day.  He will continue lisinopril hydrochlorothiazide at 160/25 1 by mouth every day.    He is given clonidine 0.1 mg take 1 every 8 hours as needed systolic over 160.  Follow-up with me within 4 days.

## 2022-05-04 ENCOUNTER — LAB VISIT (OUTPATIENT)
Dept: LAB | Facility: HOSPITAL | Age: 52
End: 2022-05-04
Attending: FAMILY MEDICINE
Payer: COMMERCIAL

## 2022-05-04 DIAGNOSIS — I10 ESSENTIAL HYPERTENSION, MALIGNANT: Primary | ICD-10-CM

## 2022-05-04 LAB
ALBUMIN SERPL-MCNC: 4.2 GM/DL (ref 3.5–5)
ALBUMIN/GLOB SERPL: 1.6 RATIO (ref 1.1–2)
ALP SERPL-CCNC: 53 UNIT/L (ref 40–150)
ALT SERPL-CCNC: 16 UNIT/L (ref 0–55)
AST SERPL-CCNC: 17 UNIT/L (ref 5–34)
BASOPHILS # BLD AUTO: 0.06 X10(3)/MCL (ref 0–0.2)
BASOPHILS NFR BLD AUTO: 0.7 %
BILIRUBIN DIRECT+TOT PNL SERPL-MCNC: 0.2 MG/DL (ref 0–0.5)
BILIRUBIN DIRECT+TOT PNL SERPL-MCNC: 0.4 MG/DL (ref 0–0.8)
BILIRUBIN DIRECT+TOT PNL SERPL-MCNC: 0.6 MG/DL
BUN SERPL-MCNC: 11 MG/DL (ref 8.9–20.6)
CALCIUM SERPL-MCNC: 9.4 MG/DL (ref 8.4–10.2)
CHLORIDE SERPL-SCNC: 103 MMOL/L (ref 98–107)
CHOLEST SERPL-MCNC: 156 MG/DL
CHOLEST/HDLC SERPL: 6 {RATIO} (ref 0–5)
CO2 SERPL-SCNC: 32 MMOL/L (ref 22–29)
CREAT SERPL-MCNC: 0.82 MG/DL (ref 0.73–1.18)
EOSINOPHIL # BLD AUTO: 0.26 X10(3)/MCL (ref 0–0.9)
EOSINOPHIL NFR BLD AUTO: 3.2 %
ERYTHROCYTE [DISTWIDTH] IN BLOOD BY AUTOMATED COUNT: 13.2 % (ref 11.5–17)
GLOBULIN SER-MCNC: 2.6 GM/DL (ref 2.4–3.5)
GLUCOSE SERPL-MCNC: 93 MG/DL (ref 74–100)
HCT VFR BLD AUTO: 53.4 % (ref 42–52)
HDLC SERPL-MCNC: 25 MG/DL (ref 35–60)
HGB BLD-MCNC: 17.7 GM/DL (ref 14–18)
IMM GRANULOCYTES # BLD AUTO: 0.02 X10(3)/MCL (ref 0–0.02)
IMM GRANULOCYTES NFR BLD AUTO: 0.2 % (ref 0–0.43)
LDLC SERPL CALC-MCNC: 102 MG/DL (ref 50–140)
LYMPHOCYTES # BLD AUTO: 2.06 X10(3)/MCL (ref 0.6–4.6)
LYMPHOCYTES NFR BLD AUTO: 25.6 %
MCH RBC QN AUTO: 31.2 PG (ref 27–31)
MCHC RBC AUTO-ENTMCNC: 33.1 MG/DL (ref 33–36)
MCV RBC AUTO: 94 FL (ref 80–94)
MONOCYTES # BLD AUTO: 0.54 X10(3)/MCL (ref 0.1–1.3)
MONOCYTES NFR BLD AUTO: 6.7 %
NEUTROPHILS # BLD AUTO: 5.1 X10(3)/MCL (ref 2.1–9.2)
NEUTROPHILS NFR BLD AUTO: 63.6 %
PLATELET # BLD AUTO: 254 X10(3)/MCL (ref 130–400)
PMV BLD AUTO: 9.7 FL (ref 9.4–12.4)
POTASSIUM SERPL-SCNC: 4.7 MMOL/L (ref 3.5–5.1)
PROT SERPL-MCNC: 6.8 GM/DL (ref 6.4–8.3)
RBC # BLD AUTO: 5.68 X10(6)/MCL (ref 4.7–6.1)
SODIUM SERPL-SCNC: 140 MMOL/L (ref 136–145)
TRIGL SERPL-MCNC: 145 MG/DL (ref 34–140)
TSH SERPL-ACNC: 0.75 UIU/ML (ref 0.35–4.94)
VLDLC SERPL CALC-MCNC: 29 MG/DL
WBC # SPEC AUTO: 8.1 X10(3)/MCL (ref 4.5–11.5)

## 2022-05-04 PROCEDURE — 80053 COMPREHEN METABOLIC PANEL: CPT

## 2022-05-04 PROCEDURE — 85025 COMPLETE CBC W/AUTO DIFF WBC: CPT

## 2022-05-04 PROCEDURE — 80061 LIPID PANEL: CPT

## 2022-05-04 PROCEDURE — 36415 COLL VENOUS BLD VENIPUNCTURE: CPT

## 2022-05-04 PROCEDURE — 84443 ASSAY THYROID STIM HORMONE: CPT

## 2022-06-03 ENCOUNTER — LAB VISIT (OUTPATIENT)
Dept: LAB | Facility: HOSPITAL | Age: 52
End: 2022-06-03
Attending: FAMILY MEDICINE
Payer: COMMERCIAL

## 2022-06-03 DIAGNOSIS — F31.0 BIPOLAR I DISORDER, MOST RECENT EPISODE HYPOMANIC: Primary | ICD-10-CM

## 2022-06-03 LAB
ANION GAP SERPL CALC-SCNC: 8 MEQ/L
BUN SERPL-MCNC: 10 MG/DL (ref 8.9–20.6)
CALCIUM SERPL-MCNC: 9.6 MG/DL (ref 8.4–10.2)
CHLORIDE SERPL-SCNC: 103 MMOL/L (ref 98–107)
CO2 SERPL-SCNC: 28 MMOL/L (ref 22–29)
CREAT SERPL-MCNC: 0.73 MG/DL (ref 0.73–1.18)
CREAT/UREA NIT SERPL: 14
GLUCOSE SERPL-MCNC: 102 MG/DL (ref 74–100)
LITHIUM SERPL-MCNC: 0.47 MMOL/L (ref 0.5–1.2)
POTASSIUM SERPL-SCNC: 4.2 MMOL/L (ref 3.5–5.1)
SODIUM SERPL-SCNC: 139 MMOL/L (ref 136–145)

## 2022-06-03 PROCEDURE — 80048 BASIC METABOLIC PNL TOTAL CA: CPT

## 2022-06-03 PROCEDURE — 80178 ASSAY OF LITHIUM: CPT

## 2022-06-03 PROCEDURE — 36415 COLL VENOUS BLD VENIPUNCTURE: CPT

## 2022-10-27 ENCOUNTER — LAB VISIT (OUTPATIENT)
Dept: LAB | Facility: HOSPITAL | Age: 52
End: 2022-10-27
Attending: FAMILY MEDICINE
Payer: COMMERCIAL

## 2022-10-27 DIAGNOSIS — F31.0 BIPOLAR I DISORDER, MOST RECENT EPISODE HYPOMANIC: Primary | ICD-10-CM

## 2022-10-27 LAB
ANION GAP SERPL CALC-SCNC: 7 MEQ/L
BUN SERPL-MCNC: 14 MG/DL (ref 8.9–20.6)
CALCIUM SERPL-MCNC: 9.6 MG/DL (ref 8.4–10.2)
CHLORIDE SERPL-SCNC: 102 MMOL/L (ref 98–107)
CO2 SERPL-SCNC: 29 MMOL/L (ref 22–29)
CREAT SERPL-MCNC: 0.87 MG/DL (ref 0.73–1.18)
CREAT/UREA NIT SERPL: 16
GFR SERPLBLD CREATININE-BSD FMLA CKD-EPI: >60 MLS/MIN/1.73/M2
GLUCOSE SERPL-MCNC: 95 MG/DL (ref 74–100)
LITHIUM SERPL-MCNC: 0.41 MMOL/L (ref 0.5–1.2)
POTASSIUM SERPL-SCNC: 4.3 MMOL/L (ref 3.5–5.1)
SODIUM SERPL-SCNC: 138 MMOL/L (ref 136–145)

## 2022-10-27 PROCEDURE — 36415 COLL VENOUS BLD VENIPUNCTURE: CPT

## 2022-10-27 PROCEDURE — 80178 ASSAY OF LITHIUM: CPT

## 2022-10-27 PROCEDURE — 80048 BASIC METABOLIC PNL TOTAL CA: CPT

## 2022-11-04 ENCOUNTER — LAB VISIT (OUTPATIENT)
Dept: LAB | Facility: HOSPITAL | Age: 52
End: 2022-11-04
Attending: FAMILY MEDICINE
Payer: COMMERCIAL

## 2022-11-04 DIAGNOSIS — I10 HYPERTENSION, UNSPECIFIED TYPE: Primary | ICD-10-CM

## 2022-11-04 DIAGNOSIS — N52.01 ERECTILE DYSFUNCTION DUE TO ARTERIAL INSUFFICIENCY: ICD-10-CM

## 2022-11-04 LAB
ALBUMIN SERPL-MCNC: 4.5 GM/DL (ref 3.5–5)
ALBUMIN/GLOB SERPL: 1.7 RATIO (ref 1.1–2)
ALP SERPL-CCNC: 57 UNIT/L (ref 40–150)
ALT SERPL-CCNC: 23 UNIT/L (ref 0–55)
AST SERPL-CCNC: 18 UNIT/L (ref 5–34)
BASOPHILS # BLD AUTO: 0.07 X10(3)/MCL (ref 0–0.2)
BASOPHILS NFR BLD AUTO: 0.7 %
BILIRUBIN DIRECT+TOT PNL SERPL-MCNC: 0.9 MG/DL
BUN SERPL-MCNC: 12 MG/DL (ref 8.9–20.6)
CALCIUM SERPL-MCNC: 9.7 MG/DL (ref 8.4–10.2)
CHLORIDE SERPL-SCNC: 103 MMOL/L (ref 98–107)
CHOLEST SERPL-MCNC: 173 MG/DL
CHOLEST/HDLC SERPL: 5 {RATIO} (ref 0–5)
CO2 SERPL-SCNC: 29 MMOL/L (ref 22–29)
CREAT SERPL-MCNC: 0.75 MG/DL (ref 0.73–1.18)
EOSINOPHIL # BLD AUTO: 0.28 X10(3)/MCL (ref 0–0.9)
EOSINOPHIL NFR BLD AUTO: 2.7 %
ERYTHROCYTE [DISTWIDTH] IN BLOOD BY AUTOMATED COUNT: 12.4 % (ref 11.5–17)
GFR SERPLBLD CREATININE-BSD FMLA CKD-EPI: >60 MLS/MIN/1.73/M2
GLOBULIN SER-MCNC: 2.6 GM/DL (ref 2.4–3.5)
GLUCOSE SERPL-MCNC: 98 MG/DL (ref 74–100)
HCT VFR BLD AUTO: 52.1 % (ref 42–52)
HDLC SERPL-MCNC: 32 MG/DL (ref 35–60)
HGB BLD-MCNC: 17.7 GM/DL (ref 14–18)
IMM GRANULOCYTES # BLD AUTO: 0.03 X10(3)/MCL (ref 0–0.04)
IMM GRANULOCYTES NFR BLD AUTO: 0.3 %
LDLC SERPL CALC-MCNC: 101 MG/DL (ref 50–140)
LYMPHOCYTES # BLD AUTO: 2.66 X10(3)/MCL (ref 0.6–4.6)
LYMPHOCYTES NFR BLD AUTO: 25.2 %
MCH RBC QN AUTO: 31.8 PG (ref 27–31)
MCHC RBC AUTO-ENTMCNC: 34 MG/DL (ref 33–36)
MCV RBC AUTO: 93.7 FL (ref 80–94)
MONOCYTES # BLD AUTO: 0.7 X10(3)/MCL (ref 0.1–1.3)
MONOCYTES NFR BLD AUTO: 6.6 %
NEUTROPHILS # BLD AUTO: 6.8 X10(3)/MCL (ref 2.1–9.2)
NEUTROPHILS NFR BLD AUTO: 64.5 %
PLATELET # BLD AUTO: 267 X10(3)/MCL (ref 130–400)
PMV BLD AUTO: 9.7 FL (ref 7.4–10.4)
POTASSIUM SERPL-SCNC: 4.2 MMOL/L (ref 3.5–5.1)
PROT SERPL-MCNC: 7.1 GM/DL (ref 6.4–8.3)
RBC # BLD AUTO: 5.56 X10(6)/MCL (ref 4.7–6.1)
SODIUM SERPL-SCNC: 139 MMOL/L (ref 136–145)
TRIGL SERPL-MCNC: 201 MG/DL (ref 34–140)
TSH SERPL-ACNC: 1.49 UIU/ML (ref 0.35–4.94)
VLDLC SERPL CALC-MCNC: 40 MG/DL
WBC # SPEC AUTO: 10.6 X10(3)/MCL (ref 4.5–11.5)

## 2022-11-04 PROCEDURE — 84443 ASSAY THYROID STIM HORMONE: CPT

## 2022-11-04 PROCEDURE — 85025 COMPLETE CBC W/AUTO DIFF WBC: CPT

## 2022-11-04 PROCEDURE — 80053 COMPREHEN METABOLIC PANEL: CPT

## 2022-11-04 PROCEDURE — 84402 ASSAY OF FREE TESTOSTERONE: CPT

## 2022-11-04 PROCEDURE — 36415 COLL VENOUS BLD VENIPUNCTURE: CPT

## 2022-11-04 PROCEDURE — 80061 LIPID PANEL: CPT

## 2022-11-09 LAB
TESTOST FREE SERPL-MCNC: 10.4 NG/DL (ref 4.26–16.4)
TESTOST SERPL-MCNC: 292 NG/DL (ref 240–950)

## 2022-12-29 DIAGNOSIS — C44.41 BASAL CELL CARCINOMA, SCALP/NECK: Primary | ICD-10-CM

## 2023-04-12 ENCOUNTER — HOSPITAL ENCOUNTER (OUTPATIENT)
Dept: RADIOLOGY | Facility: HOSPITAL | Age: 53
Discharge: HOME | End: 2023-04-12
Attending: SURGERY
Payer: COMMERCIAL

## 2023-04-12 ENCOUNTER — CLINICAL SUPPORT (OUTPATIENT)
Dept: RESPIRATORY THERAPY | Facility: HOSPITAL | Age: 53
End: 2023-04-12
Attending: SURGERY
Payer: COMMERCIAL

## 2023-04-12 DIAGNOSIS — Z01.811 PRE-OP CHEST EXAM: ICD-10-CM

## 2023-04-12 DIAGNOSIS — D23.4 BENIGN NEOPLASM OF SCALP AND SKIN OF NECK: Primary | ICD-10-CM

## 2023-04-12 DIAGNOSIS — D23.4 BENIGN NEOPLASM OF SCALP AND SKIN OF NECK: ICD-10-CM

## 2023-04-12 PROCEDURE — 93005 ELECTROCARDIOGRAM TRACING: CPT

## 2023-04-12 PROCEDURE — 71046 X-RAY EXAM CHEST 2 VIEWS: CPT | Mod: TC

## 2023-04-12 PROCEDURE — 93010 EKG 12-LEAD: ICD-10-PCS | Mod: ,,, | Performed by: INTERNAL MEDICINE

## 2023-04-12 PROCEDURE — 93010 ELECTROCARDIOGRAM REPORT: CPT | Mod: ,,, | Performed by: INTERNAL MEDICINE

## 2023-04-12 RX ORDER — DULOXETIN HYDROCHLORIDE 60 MG/1
1 CAPSULE, DELAYED RELEASE ORAL EVERY MORNING
COMMUNITY
Start: 2023-04-07

## 2023-04-12 RX ORDER — MULTIVITAMIN
1 TABLET ORAL EVERY MORNING
COMMUNITY
Start: 2023-02-06

## 2023-04-12 RX ORDER — VALSARTAN AND HYDROCHLOROTHIAZIDE 160; 25 MG/1; MG/1
1 TABLET ORAL EVERY MORNING
COMMUNITY
Start: 2023-04-07

## 2023-04-12 RX ORDER — ARIPIPRAZOLE 10 MG/1
10 TABLET ORAL NIGHTLY
COMMUNITY
Start: 2022-03-07

## 2023-04-12 RX ORDER — LITHIUM CARBONATE 450 MG/1
900 TABLET ORAL NIGHTLY
COMMUNITY
Start: 2022-03-07

## 2023-04-12 RX ORDER — CLONIDINE HYDROCHLORIDE 0.1 MG/1
TABLET ORAL
COMMUNITY
Start: 2023-02-06

## 2023-04-17 ENCOUNTER — ANESTHESIA EVENT (OUTPATIENT)
Dept: SURGERY | Facility: HOSPITAL | Age: 53
End: 2023-04-17
Payer: COMMERCIAL

## 2023-04-17 ENCOUNTER — ANESTHESIA (OUTPATIENT)
Dept: SURGERY | Facility: HOSPITAL | Age: 53
End: 2023-04-17
Payer: COMMERCIAL

## 2023-04-17 ENCOUNTER — HOSPITAL ENCOUNTER (OUTPATIENT)
Facility: HOSPITAL | Age: 53
Discharge: HOME | End: 2023-04-17
Attending: SURGERY | Admitting: SURGERY
Payer: COMMERCIAL

## 2023-04-17 DIAGNOSIS — L98.9 SCALP LESION: Primary | ICD-10-CM

## 2023-04-17 DIAGNOSIS — D23.4 BENIGN NEOPLASM OF SCALP AND SKIN OF NECK: ICD-10-CM

## 2023-04-17 PROCEDURE — 36000706: Performed by: SURGERY

## 2023-04-17 PROCEDURE — 71000015 HC POSTOP RECOV 1ST HR: Performed by: SURGERY

## 2023-04-17 PROCEDURE — D9220A PRA ANESTHESIA: ICD-10-PCS | Mod: CRNA,,, | Performed by: NURSE ANESTHETIST, CERTIFIED REGISTERED

## 2023-04-17 PROCEDURE — D9220A PRA ANESTHESIA: Mod: ANES,,, | Performed by: ANESTHESIOLOGY

## 2023-04-17 PROCEDURE — 37000008 HC ANESTHESIA 1ST 15 MINUTES: Performed by: SURGERY

## 2023-04-17 PROCEDURE — 25000003 PHARM REV CODE 250: Performed by: SURGERY

## 2023-04-17 PROCEDURE — 27201423 OPTIME MED/SURG SUP & DEVICES STERILE SUPPLY: Performed by: SURGERY

## 2023-04-17 PROCEDURE — 71000016 HC POSTOP RECOV ADDL HR: Performed by: SURGERY

## 2023-04-17 PROCEDURE — 63600175 PHARM REV CODE 636 W HCPCS: Performed by: SURGERY

## 2023-04-17 PROCEDURE — D9220A PRA ANESTHESIA: ICD-10-PCS | Mod: ANES,,, | Performed by: ANESTHESIOLOGY

## 2023-04-17 PROCEDURE — D9220A PRA ANESTHESIA: Mod: CRNA,,, | Performed by: NURSE ANESTHETIST, CERTIFIED REGISTERED

## 2023-04-17 PROCEDURE — 36000707: Performed by: SURGERY

## 2023-04-17 PROCEDURE — 25000003 PHARM REV CODE 250: Performed by: NURSE ANESTHETIST, CERTIFIED REGISTERED

## 2023-04-17 PROCEDURE — 63600175 PHARM REV CODE 636 W HCPCS: Performed by: ANESTHESIOLOGY

## 2023-04-17 PROCEDURE — 63600175 PHARM REV CODE 636 W HCPCS: Performed by: NURSE ANESTHETIST, CERTIFIED REGISTERED

## 2023-04-17 PROCEDURE — 71000033 HC RECOVERY, INTIAL HOUR: Performed by: SURGERY

## 2023-04-17 PROCEDURE — 37000009 HC ANESTHESIA EA ADD 15 MINS: Performed by: SURGERY

## 2023-04-17 RX ORDER — ONDANSETRON 2 MG/ML
4 INJECTION INTRAMUSCULAR; INTRAVENOUS EVERY 12 HOURS PRN
Status: DISCONTINUED | OUTPATIENT
Start: 2023-04-17 | End: 2023-04-17 | Stop reason: HOSPADM

## 2023-04-17 RX ORDER — HYDRALAZINE HYDROCHLORIDE 20 MG/ML
10 INJECTION INTRAMUSCULAR; INTRAVENOUS ONCE
Status: COMPLETED | OUTPATIENT
Start: 2023-04-17 | End: 2023-04-17

## 2023-04-17 RX ORDER — SODIUM CHLORIDE 9 MG/ML
INJECTION, SOLUTION INTRAVENOUS CONTINUOUS
Status: DISCONTINUED | OUTPATIENT
Start: 2023-04-17 | End: 2023-04-17 | Stop reason: HOSPADM

## 2023-04-17 RX ORDER — SODIUM CHLORIDE 0.9 % (FLUSH) 0.9 %
10 SYRINGE (ML) INJECTION
Status: DISCONTINUED | OUTPATIENT
Start: 2023-04-17 | End: 2023-04-17 | Stop reason: HOSPADM

## 2023-04-17 RX ORDER — SODIUM CHLORIDE, SODIUM LACTATE, POTASSIUM CHLORIDE, CALCIUM CHLORIDE 600; 310; 30; 20 MG/100ML; MG/100ML; MG/100ML; MG/100ML
INJECTION, SOLUTION INTRAVENOUS CONTINUOUS
Status: DISCONTINUED | OUTPATIENT
Start: 2023-04-17 | End: 2023-04-17 | Stop reason: HOSPADM

## 2023-04-17 RX ORDER — PROPOFOL 10 MG/ML
INJECTION, EMULSION INTRAVENOUS
Status: DISCONTINUED | OUTPATIENT
Start: 2023-04-17 | End: 2023-04-17

## 2023-04-17 RX ORDER — METOPROLOL TARTRATE 1 MG/ML
5 INJECTION, SOLUTION INTRAVENOUS EVERY 5 MIN PRN
Status: DISCONTINUED | OUTPATIENT
Start: 2023-04-17 | End: 2023-04-17 | Stop reason: HOSPADM

## 2023-04-17 RX ORDER — ONDANSETRON 2 MG/ML
INJECTION INTRAMUSCULAR; INTRAVENOUS
Status: DISCONTINUED | OUTPATIENT
Start: 2023-04-17 | End: 2023-04-17

## 2023-04-17 RX ORDER — HYDROCODONE BITARTRATE AND ACETAMINOPHEN 7.5; 325 MG/1; MG/1
1 TABLET ORAL ONCE
Status: COMPLETED | OUTPATIENT
Start: 2023-04-17 | End: 2023-04-17

## 2023-04-17 RX ORDER — CEFAZOLIN SODIUM 2 G/50ML
2 SOLUTION INTRAVENOUS
Status: COMPLETED | OUTPATIENT
Start: 2023-04-17 | End: 2023-04-17

## 2023-04-17 RX ORDER — HYDRALAZINE HYDROCHLORIDE 20 MG/ML
10 INJECTION INTRAMUSCULAR; INTRAVENOUS ONCE
Status: DISCONTINUED | OUTPATIENT
Start: 2023-04-17 | End: 2023-04-17

## 2023-04-17 RX ORDER — FENTANYL CITRATE 50 UG/ML
INJECTION, SOLUTION INTRAMUSCULAR; INTRAVENOUS
Status: DISCONTINUED | OUTPATIENT
Start: 2023-04-17 | End: 2023-04-17

## 2023-04-17 RX ORDER — HYDROMORPHONE HYDROCHLORIDE 2 MG/ML
0.5 INJECTION, SOLUTION INTRAMUSCULAR; INTRAVENOUS; SUBCUTANEOUS EVERY 6 HOURS PRN
Status: DISCONTINUED | OUTPATIENT
Start: 2023-04-17 | End: 2023-04-17 | Stop reason: HOSPADM

## 2023-04-17 RX ORDER — LIDOCAINE HYDROCHLORIDE 20 MG/ML
INJECTION INTRAVENOUS
Status: DISCONTINUED | OUTPATIENT
Start: 2023-04-17 | End: 2023-04-17

## 2023-04-17 RX ORDER — BUPIVACAINE HYDROCHLORIDE 2.5 MG/ML
INJECTION, SOLUTION EPIDURAL; INFILTRATION; INTRACAUDAL
Status: DISCONTINUED | OUTPATIENT
Start: 2023-04-17 | End: 2023-04-17 | Stop reason: HOSPADM

## 2023-04-17 RX ADMIN — FENTANYL CITRATE 50 MCG: 50 INJECTION, SOLUTION INTRAMUSCULAR; INTRAVENOUS at 11:04

## 2023-04-17 RX ADMIN — CEFAZOLIN SODIUM 2 G: 2 SOLUTION INTRAVENOUS at 11:04

## 2023-04-17 RX ADMIN — ONDANSETRON 4 MG: 2 INJECTION INTRAMUSCULAR; INTRAVENOUS at 11:04

## 2023-04-17 RX ADMIN — HYDRALAZINE HYDROCHLORIDE 10 MG: 20 INJECTION INTRAMUSCULAR; INTRAVENOUS at 12:04

## 2023-04-17 RX ADMIN — HYDROCODONE BITARTRATE AND ACETAMINOPHEN 1 TABLET: 7.5; 325 TABLET ORAL at 01:04

## 2023-04-17 RX ADMIN — PROPOFOL 200 MG: 10 INJECTION, EMULSION INTRAVENOUS at 11:04

## 2023-04-17 RX ADMIN — FENTANYL CITRATE 100 MCG: 50 INJECTION, SOLUTION INTRAMUSCULAR; INTRAVENOUS at 11:04

## 2023-04-17 RX ADMIN — LIDOCAINE HYDROCHLORIDE 100 MG: 20 INJECTION, SOLUTION INTRAVENOUS at 11:04

## 2023-04-17 RX ADMIN — METOPROLOL TARTRATE 5 MG: 1 INJECTION, SOLUTION INTRAVENOUS at 12:04

## 2023-04-17 RX ADMIN — SODIUM CHLORIDE, POTASSIUM CHLORIDE, SODIUM LACTATE AND CALCIUM CHLORIDE: 600; 310; 30; 20 INJECTION, SOLUTION INTRAVENOUS at 09:04

## 2023-04-17 NOTE — ANESTHESIA POSTPROCEDURE EVALUATION
Anesthesia Post Evaluation    Patient: Austin Kaplan    Procedure(s) Performed: Procedure(s) (LRB):  EXCISION, LESION (Scalp leison left post) (Left)    Final Anesthesia Type: general      Patient location during evaluation: PACU  Patient participation: Yes- Able to Participate  Level of consciousness: awake and alert  Post-procedure vital signs: reviewed and stable  Pain management: adequate  Airway patency: patent  JANIS mitigation strategies: Use of major conduction anesthesia (spinal/epidural) or peripheral nerve block and Multimodal analgesia  PONV status at discharge: No PONV  Anesthetic complications: no      Cardiovascular status: blood pressure returned to baseline  Respiratory status: unassisted  Hydration status: euvolemic  Follow-up not needed.          Vitals Value Taken Time   /89 04/17/23 0936   Temp 36.4 °C (97.5 °F) 04/17/23 0936   Pulse 79 04/17/23 0936   Resp 18 04/17/23 1129   SpO2 98 % 04/17/23 0936         No case tracking events are documented in the log.      Pain/Raven Score: No data recorded

## 2023-04-17 NOTE — ANESTHESIA PREPROCEDURE EVALUATION
04/17/2023  Austin Kaplan is a 52 y.o., male.      Pre-op Assessment    I have reviewed the Patient Summary Reports.     I have reviewed the Nursing Notes. I have reviewed the NPO Status.   I have reviewed the Medications.     Review of Systems  Anesthesia Hx:  Denies Family Hx of Anesthesia complications.   Denies Personal Hx of Anesthesia complications.   Social:  Smoker, Alcohol Use    Hematology/Oncology:  Hematology Normal   Oncology Normal     EENT/Dental:EENT/Dental Normal   Cardiovascular:   Hypertension ECG has been reviewed.    Pulmonary:   Sleep Apnea    Renal/:  Renal/ Normal     Hepatic/GI:  Hepatic/GI Normal    Musculoskeletal:  Musculoskeletal Normal    Neurological:  Neurology Normal    Endocrine:  Endocrine Normal    Dermatological:  Skin Normal    Psych:   Psychiatric History          Physical Exam  General: Cooperative, Alert and Oriented    Airway:  Mallampati: II   Mouth Opening: Normal  TM Distance: Normal  Tongue: Normal  Neck ROM: Normal ROM    Dental:  Intact        Anesthesia Plan  Type of Anesthesia, risks & benefits discussed:    Anesthesia Type: Gen Supraglottic Airway  Intra-op Monitoring Plan: Standard ASA Monitors  Post Op Pain Control Plan: multimodal analgesia  Induction:  IV  Informed Consent: Informed consent signed with the Patient and all parties understand the risks and agree with anesthesia plan.  All questions answered.   ASA Score: 3    Ready For Surgery From Anesthesia Perspective.     .

## 2023-04-17 NOTE — DISCHARGE SUMMARY
[facility] - Periop Services  Discharge Note  Short Stay    Procedure(s) (LRB):  EXCISION, LESION (Scalp leison left post) (Left) 5      OUTCOME: Patient tolerated treatment/procedure well without complication and is now ready for discharge.    DISPOSITION: Home or Self Care    FINAL DIAGNOSIS:  Benign neoplasm of scalp and skin of neck    FOLLOWUP: In clinic 1 week    DISCHARGE INSTRUCTIONS:    Discharge Procedure Orders   Diet general         Clinical Reference Documents Added to Patient Instructions         Document    WOUND CARE DISCHARGE INSTRUCTIONS (ENGLISH)            TIME SPENT ON DISCHARGE:    Five minutes

## 2023-04-17 NOTE — ANESTHESIA PROCEDURE NOTES
Intubation    Date/Time: 4/17/2023 11:19 AM  Performed by: Mikey Cook CRNA  Authorized by: Robson Oneal DO     Intubation:     Induction:  Intravenous    Mask Ventilation:  N/a    Attempts:  1    Attempted By:  CRNA    Difficult Airway Encountered?: No      Complications:  None    Airway Device:  Supraglottic airway/LMA (IGEL)    Airway Device Size:  4.0    Style/Cuff Inflation:  Uncuffed    Placement Verified By:  Capnometry    Complicating Factors:  None    Findings Post-Intubation:  BS equal bilateral and atraumatic/condition of teeth unchanged

## 2023-04-17 NOTE — OP NOTE
[facility] - Periop Services  Operative Note      Date of Procedure: 4/17/2023     Procedure: Procedure(s) (LRB):  EXCISION, LESION (Scalp leison left post) (Left)     Surgeon(s) and Role:     * Guille Francis MD - Primary    Assisting Surgeon: None    Pre-Operative Diagnosis: Benign neoplasm of scalp and skin of neck [D23.4]    Post-Operative Diagnosis: Post-Op Diagnosis Codes:     * Benign neoplasm of scalp and skin of neck [D23.4]    Anesthesia: General    Operative Findings (including complications, if any):  Patient is a 52-year-old  male with a history of hypertension, COPD, smoking 1-1/2 pack a day for 30 years, sleep apnea on CPAP, bipolar manic depression, morbidly obese at 5 ft 10375 lb.  Patient had a lesion on the left scalp that was at least 4 5 cm in size the area of excision was 7 by 4 cm.    Patient was brought to the OR general anesthetic with LMA underwent injection of local and then excision of the lesion a 7 cm x 4 cm area of excision was performed the posterior inferior aspect of the lesion was marked with a single stitch.  Patient had the deep layer closed with 3-0 Vicryl the skin was closed with 2-0 nylon 2-0 plain gut was also used for closure.  Sterile dressings were applied no problems encountered patient tolerated procedure well.  Hemostasis was achieved with Bovie cautery and retention sutures.        Description of Technical Procedures:  Noted above       Significant Surgical Tasks Conducted by the Assistant(s), if Applicable:  None       Estimated Blood Loss (EBL): 150 mL           Implants: * No implants in log *    Specimens:   Specimen (24h ago, onward)       Start     Ordered    04/17/23 1140  Specimen to Pathology  RELEASE UPON ORDERING        References:    Click here for ordering Quick Tip   Question:  Release to patient  Answer:  Immediate    04/17/23 1140                            Condition: Good    Disposition: PACU - hemodynamically  stable.    Attestation: I was present and scrubbed for the entire procedure.    Discharge Note    OUTCOME: Patient tolerated treatment/procedure well without complication and is now ready for discharge.    DISPOSITION: Home or Self Care    FINAL DIAGNOSIS:  Benign neoplasm of scalp and skin of neck    FOLLOWUP: In clinic one-week    DISCHARGE INSTRUCTIONS:    Discharge Procedure Orders   Diet general        Clinical Reference Documents Added to Patient Instructions         Document    WOUND CARE DISCHARGE INSTRUCTIONS (ENGLISH)

## 2023-04-20 VITALS
BODY MASS INDEX: 37.38 KG/M2 | DIASTOLIC BLOOD PRESSURE: 96 MMHG | TEMPERATURE: 98 F | HEART RATE: 82 BPM | SYSTOLIC BLOOD PRESSURE: 145 MMHG | RESPIRATION RATE: 20 BRPM | WEIGHT: 267 LBS | HEIGHT: 71 IN | OXYGEN SATURATION: 99 %

## 2023-04-21 LAB — PSYCHE PATHOLOGY RESULT: NORMAL

## 2023-07-28 ENCOUNTER — LAB VISIT (OUTPATIENT)
Dept: LAB | Facility: HOSPITAL | Age: 53
End: 2023-07-28
Attending: FAMILY MEDICINE
Payer: COMMERCIAL

## 2023-07-28 DIAGNOSIS — E29.1 3-OXO-5 ALPHA-STEROID DELTA 4-DEHYDROGENASE DEFICIENCY: ICD-10-CM

## 2023-07-28 DIAGNOSIS — Z12.5 SPECIAL SCREENING FOR MALIGNANT NEOPLASM OF PROSTATE: ICD-10-CM

## 2023-07-28 DIAGNOSIS — Z00.00 ROUTINE GENERAL MEDICAL EXAMINATION AT A HEALTH CARE FACILITY: Primary | ICD-10-CM

## 2023-07-28 LAB
ALBUMIN SERPL-MCNC: 3.9 G/DL (ref 3.5–5)
ALBUMIN/GLOB SERPL: 1.3 RATIO (ref 1.1–2)
ALP SERPL-CCNC: 60 UNIT/L (ref 40–150)
ALT SERPL-CCNC: 18 UNIT/L (ref 0–55)
AST SERPL-CCNC: 17 UNIT/L (ref 5–34)
BASOPHILS # BLD AUTO: 0.06 X10(3)/MCL
BASOPHILS NFR BLD AUTO: 0.7 %
BILIRUBIN DIRECT+TOT PNL SERPL-MCNC: 0.3 MG/DL
BUN SERPL-MCNC: 12 MG/DL (ref 8.4–25.7)
CALCIUM SERPL-MCNC: 9.5 MG/DL (ref 8.4–10.2)
CHLORIDE SERPL-SCNC: 105 MMOL/L (ref 98–107)
CHOLEST SERPL-MCNC: 175 MG/DL
CHOLEST/HDLC SERPL: 5 {RATIO} (ref 0–5)
CO2 SERPL-SCNC: 32 MMOL/L (ref 22–29)
CREAT SERPL-MCNC: 0.77 MG/DL (ref 0.73–1.18)
EOSINOPHIL # BLD AUTO: 0.36 X10(3)/MCL (ref 0–0.9)
EOSINOPHIL NFR BLD AUTO: 4.3 %
ERYTHROCYTE [DISTWIDTH] IN BLOOD BY AUTOMATED COUNT: 13.2 % (ref 11.5–17)
GFR SERPLBLD CREATININE-BSD FMLA CKD-EPI: >60 MLS/MIN/1.73/M2
GLOBULIN SER-MCNC: 3.1 GM/DL (ref 2.4–3.5)
GLUCOSE SERPL-MCNC: 109 MG/DL (ref 74–100)
HCT VFR BLD AUTO: 48 % (ref 42–52)
HDLC SERPL-MCNC: 32 MG/DL (ref 35–60)
HGB BLD-MCNC: 15.5 G/DL (ref 14–18)
IMM GRANULOCYTES # BLD AUTO: 0.04 X10(3)/MCL (ref 0–0.04)
IMM GRANULOCYTES NFR BLD AUTO: 0.5 %
LDLC SERPL CALC-MCNC: 108 MG/DL (ref 50–140)
LYMPHOCYTES # BLD AUTO: 2.37 X10(3)/MCL (ref 0.6–4.6)
LYMPHOCYTES NFR BLD AUTO: 28 %
MCH RBC QN AUTO: 30.3 PG (ref 27–31)
MCHC RBC AUTO-ENTMCNC: 32.3 G/DL (ref 33–36)
MCV RBC AUTO: 93.8 FL (ref 80–94)
MONOCYTES # BLD AUTO: 0.46 X10(3)/MCL (ref 0.1–1.3)
MONOCYTES NFR BLD AUTO: 5.4 %
NEUTROPHILS # BLD AUTO: 5.16 X10(3)/MCL (ref 2.1–9.2)
NEUTROPHILS NFR BLD AUTO: 61.1 %
PLATELET # BLD AUTO: 275 X10(3)/MCL (ref 130–400)
PMV BLD AUTO: 9.2 FL (ref 7.4–10.4)
POTASSIUM SERPL-SCNC: 4.5 MMOL/L (ref 3.5–5.1)
PROT SERPL-MCNC: 7 GM/DL (ref 6.4–8.3)
PSA SERPL-MCNC: 1.08 NG/ML
RBC # BLD AUTO: 5.12 X10(6)/MCL (ref 4.7–6.1)
SODIUM SERPL-SCNC: 142 MMOL/L (ref 136–145)
TRIGL SERPL-MCNC: 175 MG/DL (ref 34–140)
TSH SERPL-ACNC: 0.76 UIU/ML (ref 0.35–4.94)
VLDLC SERPL CALC-MCNC: 35 MG/DL
WBC # SPEC AUTO: 8.45 X10(3)/MCL (ref 4.5–11.5)

## 2023-07-28 PROCEDURE — 36415 COLL VENOUS BLD VENIPUNCTURE: CPT

## 2023-07-28 PROCEDURE — 84443 ASSAY THYROID STIM HORMONE: CPT

## 2023-07-28 PROCEDURE — 80061 LIPID PANEL: CPT

## 2023-07-28 PROCEDURE — 85025 COMPLETE CBC W/AUTO DIFF WBC: CPT

## 2023-07-28 PROCEDURE — 80053 COMPREHEN METABOLIC PANEL: CPT

## 2023-07-28 PROCEDURE — 84153 ASSAY OF PSA TOTAL: CPT

## 2023-07-28 PROCEDURE — 84402 ASSAY OF FREE TESTOSTERONE: CPT

## 2023-07-31 ENCOUNTER — CLINICAL SUPPORT (OUTPATIENT)
Dept: RESPIRATORY THERAPY | Facility: HOSPITAL | Age: 53
End: 2023-07-31
Attending: SURGERY
Payer: COMMERCIAL

## 2023-07-31 DIAGNOSIS — Z12.11 SCREENING FOR COLON CANCER: Primary | ICD-10-CM

## 2023-07-31 DIAGNOSIS — Z12.11 SCREENING FOR COLON CANCER: ICD-10-CM

## 2023-07-31 PROCEDURE — 93005 ELECTROCARDIOGRAM TRACING: CPT

## 2023-07-31 PROCEDURE — 93010 EKG 12-LEAD: ICD-10-PCS | Mod: ,,, | Performed by: INTERNAL MEDICINE

## 2023-07-31 PROCEDURE — 93010 ELECTROCARDIOGRAM REPORT: CPT | Mod: ,,, | Performed by: INTERNAL MEDICINE

## 2023-07-31 NOTE — DISCHARGE INSTRUCTIONS
Follow prep as instructed--nothing to eat or drink after midnight Wednesday 8-2-23--must have  upon discharge                                                                                           INSTRUCTIONS  AFTER A COLONOSCOPY/EGD                                                                                    NO DRIVING X 24 HOURS. NOTIFY YOUR DOCTOR WITH                                                                                 ABDOMINAL PAIN UNRELIEVED BY  PASSING GAS,                                                                           FEVER WITHIN 24 HOURS, OR LARGE AMOUNT OF BLEEDING.

## 2023-08-02 ENCOUNTER — LAB VISIT (OUTPATIENT)
Dept: LAB | Facility: HOSPITAL | Age: 53
End: 2023-08-02
Attending: SURGERY
Payer: COMMERCIAL

## 2023-08-02 ENCOUNTER — ANESTHESIA EVENT (OUTPATIENT)
Dept: SURGERY | Facility: HOSPITAL | Age: 53
End: 2023-08-02
Payer: COMMERCIAL

## 2023-08-02 DIAGNOSIS — Z12.11 SCREENING FOR COLON CANCER: Primary | ICD-10-CM

## 2023-08-02 LAB
HEMOCCULT SP1 STL QL: NEGATIVE
HEMOCCULT SP2 STL QL: NEGATIVE
HEMOCCULT SP3 STL QL: NEGATIVE

## 2023-08-02 PROCEDURE — 82270 OCCULT BLOOD FECES: CPT

## 2023-08-02 NOTE — ANESTHESIA PREPROCEDURE EVALUATION
08/02/2023  Austin Kaplan is a 52 y.o., male.      Pre-op Assessment    I have reviewed the Patient Summary Reports.     I have reviewed the Nursing Notes. I have reviewed the NPO Status.   I have reviewed the Medications.     Review of Systems  Anesthesia Hx:  Denies Family Hx of Anesthesia complications.   Denies Personal Hx of Anesthesia complications.   Social:  Smoker, Alcohol Use    Hematology/Oncology:  Hematology Normal   Oncology Normal     EENT/Dental:EENT/Dental Normal   Cardiovascular:   Hypertension ECG has been reviewed.    Pulmonary:   Sleep Apnea    Renal/:  Renal/ Normal     Hepatic/GI:  Hepatic/GI Normal    Musculoskeletal:  Musculoskeletal Normal    Neurological:  Neurology Normal    Endocrine:  Endocrine Normal    Dermatological:  Skin Normal    Psych:   Psychiatric History          Physical Exam  General: Cooperative, Alert and Oriented    Airway:  Mallampati: II   Mouth Opening: Normal  TM Distance: Normal  Tongue: Normal  Neck ROM: Normal ROM    Dental:  Intact        Anesthesia Plan  Type of Anesthesia, risks & benefits discussed:    Anesthesia Type: Gen Natural Airway  Intra-op Monitoring Plan: Standard ASA Monitors  Post Op Pain Control Plan:   (medical reason for not using multimodal pain management)  Induction:  IV  Informed Consent: Informed consent signed with the Patient and all parties understand the risks and agree with anesthesia plan.  All questions answered. Patient consented to blood products? Yes  ASA Score: 3    Ready For Surgery From Anesthesia Perspective.     .

## 2023-08-03 ENCOUNTER — ANESTHESIA (OUTPATIENT)
Dept: SURGERY | Facility: HOSPITAL | Age: 53
End: 2023-08-03
Payer: COMMERCIAL

## 2023-08-03 ENCOUNTER — HOSPITAL ENCOUNTER (OUTPATIENT)
Facility: HOSPITAL | Age: 53
Discharge: HOME | End: 2023-08-03
Attending: SURGERY | Admitting: SURGERY
Payer: COMMERCIAL

## 2023-08-03 VITALS
RESPIRATION RATE: 18 BRPM | DIASTOLIC BLOOD PRESSURE: 95 MMHG | TEMPERATURE: 96 F | WEIGHT: 270 LBS | BODY MASS INDEX: 37.8 KG/M2 | HEIGHT: 71 IN | HEART RATE: 84 BPM | OXYGEN SATURATION: 94 % | SYSTOLIC BLOOD PRESSURE: 145 MMHG

## 2023-08-03 DIAGNOSIS — Z12.11 SCREENING FOR COLON CANCER: Primary | ICD-10-CM

## 2023-08-03 DIAGNOSIS — Z12.11 SCREEN FOR COLON CANCER: ICD-10-CM

## 2023-08-03 PROCEDURE — D9220A PRA ANESTHESIA: ICD-10-PCS | Mod: 33,,, | Performed by: NURSE ANESTHETIST, CERTIFIED REGISTERED

## 2023-08-03 PROCEDURE — D9220A PRA ANESTHESIA: Mod: 33,,, | Performed by: NURSE ANESTHETIST, CERTIFIED REGISTERED

## 2023-08-03 PROCEDURE — 37000009 HC ANESTHESIA EA ADD 15 MINS: Performed by: SURGERY

## 2023-08-03 PROCEDURE — C1773 RET DEV, INSERTABLE: HCPCS | Performed by: SURGERY

## 2023-08-03 PROCEDURE — 37000008 HC ANESTHESIA 1ST 15 MINUTES: Performed by: SURGERY

## 2023-08-03 PROCEDURE — 63600175 PHARM REV CODE 636 W HCPCS: Performed by: ANESTHESIOLOGY

## 2023-08-03 PROCEDURE — 45385 COLONOSCOPY W/LESION REMOVAL: CPT | Mod: PT | Performed by: SURGERY

## 2023-08-03 PROCEDURE — 25000003 PHARM REV CODE 250: Performed by: NURSE ANESTHETIST, CERTIFIED REGISTERED

## 2023-08-03 PROCEDURE — 63600175 PHARM REV CODE 636 W HCPCS: Performed by: NURSE ANESTHETIST, CERTIFIED REGISTERED

## 2023-08-03 RX ORDER — PROPOFOL 10 MG/ML
VIAL (ML) INTRAVENOUS
Status: DISCONTINUED | OUTPATIENT
Start: 2023-08-03 | End: 2023-08-03

## 2023-08-03 RX ORDER — LIDOCAINE HYDROCHLORIDE 20 MG/ML
INJECTION, SOLUTION EPIDURAL; INFILTRATION; INTRACAUDAL; PERINEURAL
Status: DISCONTINUED | OUTPATIENT
Start: 2023-08-03 | End: 2023-08-03

## 2023-08-03 RX ORDER — SODIUM CHLORIDE, SODIUM LACTATE, POTASSIUM CHLORIDE, CALCIUM CHLORIDE 600; 310; 30; 20 MG/100ML; MG/100ML; MG/100ML; MG/100ML
INJECTION, SOLUTION INTRAVENOUS CONTINUOUS
Status: DISCONTINUED | OUTPATIENT
Start: 2023-08-03 | End: 2023-08-03 | Stop reason: HOSPADM

## 2023-08-03 RX ORDER — SODIUM CHLORIDE 9 MG/ML
INJECTION, SOLUTION INTRAVENOUS CONTINUOUS
Status: DISCONTINUED | OUTPATIENT
Start: 2023-08-03 | End: 2023-08-03 | Stop reason: HOSPADM

## 2023-08-03 RX ADMIN — PROPOFOL 50 MG: 10 INJECTION, EMULSION INTRAVENOUS at 10:08

## 2023-08-03 RX ADMIN — SODIUM CHLORIDE, POTASSIUM CHLORIDE, SODIUM LACTATE AND CALCIUM CHLORIDE: 600; 310; 30; 20 INJECTION, SOLUTION INTRAVENOUS at 09:08

## 2023-08-03 RX ADMIN — PROPOFOL 30 MG: 10 INJECTION, EMULSION INTRAVENOUS at 10:08

## 2023-08-03 RX ADMIN — LIDOCAINE HYDROCHLORIDE 60 MG: 20 INJECTION, SOLUTION EPIDURAL; INFILTRATION; INTRACAUDAL; PERINEURAL at 10:08

## 2023-08-03 RX ADMIN — PROPOFOL 150 MG: 10 INJECTION, EMULSION INTRAVENOUS at 10:08

## 2023-08-03 NOTE — DISCHARGE SUMMARY
[facility] - Periop Services  Discharge Note  Short Stay    Procedure(s) (LRB):  COLONOSCOPY (N/A)  COLONOSCOPY, WITH POLYPECTOMY USING SNARE (N/A)      OUTCOME: Patient tolerated treatment/procedure well without complication and is now ready for discharge.    DISPOSITION: Home or Self Care    FINAL DIAGNOSIS:  Screen for colon cancer normal colonoscopy with polyp in the rectum diverticulosis of the sigmoid    FOLLOWUP: In clinic 1 week    DISCHARGE INSTRUCTIONS:    Discharge Procedure Orders   Diet general        TIME SPENT ON DISCHARGE:  5 minutes

## 2023-08-03 NOTE — OP NOTE
[facility] - Periop Services  Operative Note      Date of Procedure: 8/3/2023     Procedure: Procedure(s) (LRB):  COLONOSCOPY (N/A)  COLONOSCOPY, WITH POLYPECTOMY USING SNARE (N/A)     Surgeon(s) and Role:     * Guille Francis MD - Primary    Assisting Surgeon: None    Pre-Operative Diagnosis: Screen for colon cancer [Z12.11]    Post-Operative Diagnosis: Post-Op Diagnosis Codes:     * Screen for colon cancer [Z12.11]  1. Normal terminal ileum up to 10 cm   2. Normal cecum ascending colon transverse colon descending colon  3. Mild diverticular disease of the rectosigmoid colon  4. Polyp in the rectum at 7 cm removed with a hot loop snare  5. Grade 2-3 internal hemorrhoids with good tone no masses    Anesthesia: General    Operative Findings (including complications, if any):  Patient is a 52-year-old  male with a history of hypertension COPD sleep apnea smokes 1-1/2 pack a day for about 30 years on CPAP as well patient has bipolar manic depression with morbid obesity at 5 ft 31151 lb.  Patient needed an age-appropriate screening colonoscopy and was scheduled for colonoscopy today.      Patient was brought to the GI suite underwent sedation and examination of the colon all the way to the cecum   Terminal ileum was normal up to 10 cm  Cecum ascending colon transverse colon descending colon were normal   Rectosigmoid had diverticulosis mild-to-moderate   polyp at 7 cm removed with a hot loop snare clear margin obtained no ink spot necessary about a cm in size sent to pathology  Grade 2-3 internal hemorrhoids were noted      Plan  1. Follow up in a week to discuss results  2. Follow up in 2 years recheck stools for blood   3. Follow up in 3  years repeat screening colonoscopy      Description of Technical Procedures:  Noted above       Significant Surgical Tasks Conducted by the Assistant(s), if Applicable:  None       Estimated Blood Loss (EBL): 0 mL           Implants: * No implants in log  *    Specimens:   Specimen (24h ago, onward)       Start     Ordered    08/03/23 1046  Specimen to Pathology  RELEASE UPON ORDERING        References:    Click here for ordering Quick Tip   Question:  Release to patient  Answer:  Immediate    08/03/23 1046                            Condition: Good    Disposition: PACU - hemodynamically stable.    Attestation: I was present and scrubbed for the entire procedure.    Discharge Note    OUTCOME: Patient tolerated treatment/procedure well without complication and is now ready for discharge.    DISPOSITION: Home or Self Care    FINAL DIAGNOSIS:  Normal terminal ileum, normal colonoscopy with diverticulosis of sigmoid colon, polyp at 7 cm removed with a hot loop snare, grade 2-3 internal hemorrhoids    FOLLOWUP: In clinic 1 week    DISCHARGE INSTRUCTIONS:  No discharge procedures on file.

## 2023-08-03 NOTE — ANESTHESIA POSTPROCEDURE EVALUATION
Anesthesia Post Evaluation    Patient: Austin Kaplan    Procedure(s) Performed: Procedure(s) (LRB):  COLONOSCOPY (N/A)  COLONOSCOPY, WITH POLYPECTOMY USING SNARE (N/A)    Final Anesthesia Type: general      Patient participation: Yes- Able to Participate  Level of consciousness: awake and alert and oriented  Post-procedure vital signs: reviewed and stable  Pain management: adequate  Airway patency: patent    PONV status at discharge: No PONV  Anesthetic complications: no      Cardiovascular status: stable  Respiratory status: unassisted, spontaneous ventilation and room air  Hydration status: euvolemic  Follow-up not needed.          Vitals Value Taken Time   /83 08/03/23 0908   Temp 36.6 °C (97.8 °F) 08/03/23 0908   Pulse 87 08/03/23 0908   Resp 16 08/03/23 1045   SpO2 94 % 08/03/23 0908         No case tracking events are documented in the log.      Pain/Raven Score: No data recorded

## 2023-08-06 ENCOUNTER — HOSPITAL ENCOUNTER (EMERGENCY)
Facility: HOSPITAL | Age: 53
Discharge: HOME | End: 2023-08-06
Attending: INTERNAL MEDICINE
Payer: COMMERCIAL

## 2023-08-06 VITALS
SYSTOLIC BLOOD PRESSURE: 125 MMHG | OXYGEN SATURATION: 98 % | TEMPERATURE: 98 F | HEIGHT: 71 IN | RESPIRATION RATE: 18 BRPM | WEIGHT: 273.38 LBS | HEART RATE: 96 BPM | BODY MASS INDEX: 38.27 KG/M2 | DIASTOLIC BLOOD PRESSURE: 79 MMHG

## 2023-08-06 DIAGNOSIS — M79.89 PAIN AND SWELLING OF RIGHT LOWER LEG: Primary | ICD-10-CM

## 2023-08-06 DIAGNOSIS — M79.661 PAIN AND SWELLING OF RIGHT LOWER LEG: Primary | ICD-10-CM

## 2023-08-06 PROCEDURE — 99284 EMERGENCY DEPT VISIT MOD MDM: CPT | Mod: 25

## 2023-08-06 RX ORDER — DICLOFENAC SODIUM 50 MG/1
50 TABLET, DELAYED RELEASE ORAL 3 TIMES DAILY
Qty: 21 TABLET | Refills: 0 | Status: SHIPPED | OUTPATIENT
Start: 2023-08-06 | End: 2023-08-13

## 2023-08-06 NOTE — DISCHARGE INSTRUCTIONS
You have been prescribed Diclofenac for pain. This is an Non-Steroidal Anti-Inflammatory (NSAID) Medication. Please do not take any additional NSAIDs while you are taking this medication including (Advil, Aleve, Motrin, Ibuprofen, Mobic\meloxicam, Naprosyn, Toradol, ketoralac, etc.). Please stop taking this medication if you experience: weakness, itching, yellow skin or eyes, joint pains, vomiting blood, blood or black stools, unusual weight gain, or swelling in your arms, legs, hands, or feet.     May take tylenol for pain. Take 2 extra strength tylenol at one time.

## 2023-08-06 NOTE — ED PROVIDER NOTES
Encounter Date: 8/6/2023       History     Chief Complaint   Patient presents with    Leg Pain     C/o R leg pain starting this morning. Denies swelling. States he took Tylenol with some relief.     52-year-old male presents to ED for evaluation of right leg pain worsening since this morning.  Patient states he was standing and walking around on his leg all day at work when he started to have pain in his calf.  Reports swelling earlier that has improved.  Denies any trauma or injury.  States he took 4 extra strength Tylenol at home with moderate relief.  Reports called PCP on-call and sent to ED for further evaluation.    The history is provided by the patient. No  was used.     Review of patient's allergies indicates:   Allergen Reactions    Peg-electrolyte soln Hives and Itching    Venlafaxine      Other reaction(s): High blood pressure     Past Medical History:   Diagnosis Date    Basal cell carcinoma     Bipolar disorder     HTN (hypertension)     Sleep apnea     CPAP     Past Surgical History:   Procedure Laterality Date    COLONOSCOPY N/A 8/3/2023    Procedure: COLONOSCOPY;  Surgeon: Guille Francis MD;  Location: [facility];  Service: Endoscopy;  Laterality: N/A;  DIVERTICULOSIS OF THE SIGMOID; NORMAL ILEUM; GRADE 2 INTERNAL HEMORRHOIDS, RECTAL POLYP      COLONOSCOPY, WITH POLYPECTOMY USING SNARE N/A 8/3/2023    Procedure: COLONOSCOPY, WITH POLYPECTOMY USING SNARE;  Surgeon: Guille Francis MD;  Location: [facility];  Service: Endoscopy;  Laterality: N/A;  RECTAL POLYP, 7CM IN RECTUM    EXCISION OF LESION Left 4/17/2023    Procedure: EXCISION, LESION (Scalp leison left post);  Surgeon: Guille Francis MD;  Location: [facility];  Service: General;  Laterality: Left;    INGUINAL HERNIA REPAIR Right     TONSILLECTOMY      TYMPANOSTOMY TUBE PLACEMENT       Family History   Problem Relation Age of Onset    Hypertension Mother     Heart attack Mother     Diabetes Father      Social History      Tobacco Use    Smoking status: Former     Current packs/day: 1.00     Types: Cigarettes    Smokeless tobacco: Former     Types: Chew   Substance Use Topics    Alcohol use: Yes     Comment: Socially    Drug use: Yes     Types: Marijuana     Review of Systems   Constitutional:  Negative for chills, fatigue and fever.   HENT:  Negative for sore throat.    Respiratory:  Negative for cough and shortness of breath.    Cardiovascular:  Positive for leg swelling. Negative for chest pain.   Gastrointestinal:  Negative for abdominal pain, nausea and vomiting.   Genitourinary:  Negative for dysuria and frequency.   Musculoskeletal:  Positive for myalgias. Negative for back pain and neck pain.   Skin:  Negative for rash.   Neurological:  Negative for dizziness, weakness and headaches.   Hematological:  Does not bruise/bleed easily.   All other systems reviewed and are negative.      Physical Exam     Initial Vitals [08/06/23 1620]   BP Pulse Resp Temp SpO2   132/83 108 20 98.1 °F (36.7 °C) 97 %      MAP       --         Physical Exam    Nursing note and vitals reviewed.  Constitutional: He appears well-developed. He is cooperative.   HENT:   Head: Normocephalic and atraumatic.   Right Ear: Tympanic membrane and external ear normal.   Left Ear: Tympanic membrane and external ear normal.   Mouth/Throat: Uvula is midline, oropharynx is clear and moist and mucous membranes are normal. No trismus in the jaw. No uvula swelling.   Eyes: Conjunctivae are normal. Pupils are equal, round, and reactive to light.   Neck: Neck supple.   Normal range of motion.  Cardiovascular:  Normal rate, regular rhythm and normal heart sounds.           Pulmonary/Chest: Breath sounds normal. No respiratory distress. He has no wheezes. He has no rhonchi. He has no rales.   Abdominal: Abdomen is soft. Bowel sounds are normal. There is no abdominal tenderness.   Musculoskeletal:         General: Normal range of motion.      Cervical back: Normal range  of motion and neck supple.        Legs:       Comments: Tenderness noted to posterior calf. No swelling or erythema noted. DP pulses 2+. All other adjacent joints otherwise normal       Neurological: He is alert and oriented to person, place, and time. He has normal strength. No cranial nerve deficit or sensory deficit. GCS score is 15. GCS eye subscore is 4. GCS verbal subscore is 5. GCS motor subscore is 6.   Skin: Skin is warm and dry. Capillary refill takes less than 2 seconds.   Psychiatric: He has a normal mood and affect.         ED Course   Procedures  Labs Reviewed - No data to display       Imaging Results              US Lower Extremity Veins Right (In process)                      Medications - No data to display  Medical Decision Making:   Initial Assessment:   52-year-old male presents to ED for evaluation of right leg pain worsening since this morning.  Patient states he was standing and walking around on his leg all day at work when he started to have pain in his calf.  Reports swelling earlier that has improved.  Denies any trauma or injury.  States he took 4 extra strength Tylenol at home with moderate relief.  Reports called PCP on-call and sent to ED for further evaluation.  Differential Diagnosis:   Calf pain, left pain, sciatica, DVT   Clinical Tests:   Radiological Study: Ordered and Reviewed  ED Management:  52-year-old male presents to ED for evaluation of right lower leg pain and swelling starting today.  Patient reports started after walking on his leg.  Denies any trauma or injury.  Ultrasound obtained to rule out DVT negative.  Will give short course of NSAIDs for pain and swelling.  Return ED precautions given.  Patient verbalizes understanding and agrees with plan care.                          Clinical Impression:   Final diagnoses:  [M79.661, M79.89] Pain and swelling of right lower leg (Primary)        ED Disposition Condition    Discharge Stable          ED Prescriptions        Medication Sig Dispense Start Date End Date Auth. Provider    diclofenac (VOLTAREN) 50 MG EC tablet Take 1 tablet (50 mg total) by mouth 3 (three) times daily. for 7 days 21 tablet 8/6/2023 8/13/2023 Brianna Curiel PA          Follow-up Information       Follow up With Specialties Details Why Contact Info    Mike Castro MD Family Medicine   [address] [place] [address] [id number] [phone]               Brianna Curiel PA  08/06/23 3363

## 2023-08-07 LAB — PSYCHE PATHOLOGY RESULT: NORMAL

## 2023-08-13 LAB
TESTOST FREE SERPL-MCNC: 9.95 NG/DL (ref 4.06–15.6)
TESTOST SERPL-MCNC: 268 NG/DL (ref 240–950)

## 2024-05-03 DIAGNOSIS — M25.561 RIGHT KNEE PAIN: Primary | ICD-10-CM

## 2024-05-06 ENCOUNTER — LAB VISIT (OUTPATIENT)
Dept: LAB | Facility: HOSPITAL | Age: 54
End: 2024-05-06
Attending: FAMILY MEDICINE
Payer: COMMERCIAL

## 2024-05-06 DIAGNOSIS — I11.9 MALIGNANT HYPERTENSIVE HEART DISEASE WITHOUT HEART FAILURE: Primary | ICD-10-CM

## 2024-05-06 DIAGNOSIS — E29.1 3-OXO-5 ALPHA-STEROID DELTA 4-DEHYDROGENASE DEFICIENCY: ICD-10-CM

## 2024-05-06 LAB
ALBUMIN SERPL-MCNC: 4.4 G/DL (ref 3.5–5)
ALBUMIN/GLOB SERPL: 1.5 RATIO (ref 1.1–2)
ALP SERPL-CCNC: 56 UNIT/L (ref 40–150)
ALT SERPL-CCNC: 17 UNIT/L (ref 0–55)
AST SERPL-CCNC: 17 UNIT/L (ref 5–34)
BASOPHILS # BLD AUTO: 0.07 X10(3)/MCL
BASOPHILS NFR BLD AUTO: 0.6 %
BILIRUB SERPL-MCNC: 0.8 MG/DL
BUN SERPL-MCNC: 14 MG/DL (ref 8.4–25.7)
CALCIUM SERPL-MCNC: 9.6 MG/DL (ref 8.4–10.2)
CHLORIDE SERPL-SCNC: 102 MMOL/L (ref 98–107)
CHOLEST SERPL-MCNC: 194 MG/DL
CHOLEST/HDLC SERPL: 6 {RATIO} (ref 0–5)
CO2 SERPL-SCNC: 29 MMOL/L (ref 22–29)
CREAT SERPL-MCNC: 0.87 MG/DL (ref 0.73–1.18)
EOSINOPHIL # BLD AUTO: 0.27 X10(3)/MCL (ref 0–0.9)
EOSINOPHIL NFR BLD AUTO: 2.4 %
ERYTHROCYTE [DISTWIDTH] IN BLOOD BY AUTOMATED COUNT: 13 % (ref 11.5–17)
GFR SERPLBLD CREATININE-BSD FMLA CKD-EPI: >60 MLS/MIN/1.73/M2
GLOBULIN SER-MCNC: 3 GM/DL (ref 2.4–3.5)
GLUCOSE SERPL-MCNC: 90 MG/DL (ref 74–100)
HCT VFR BLD AUTO: 49.3 % (ref 42–52)
HDLC SERPL-MCNC: 33 MG/DL (ref 35–60)
HGB BLD-MCNC: 17 G/DL (ref 14–18)
IMM GRANULOCYTES # BLD AUTO: 0.03 X10(3)/MCL (ref 0–0.04)
IMM GRANULOCYTES NFR BLD AUTO: 0.3 %
LDLC SERPL CALC-MCNC: 117 MG/DL (ref 50–140)
LYMPHOCYTES # BLD AUTO: 3.37 X10(3)/MCL (ref 0.6–4.6)
LYMPHOCYTES NFR BLD AUTO: 30.4 %
MCH RBC QN AUTO: 32.3 PG (ref 27–31)
MCHC RBC AUTO-ENTMCNC: 34.5 G/DL (ref 33–36)
MCV RBC AUTO: 93.7 FL (ref 80–94)
MONOCYTES # BLD AUTO: 0.7 X10(3)/MCL (ref 0.1–1.3)
MONOCYTES NFR BLD AUTO: 6.3 %
NEUTROPHILS # BLD AUTO: 6.65 X10(3)/MCL (ref 2.1–9.2)
NEUTROPHILS NFR BLD AUTO: 60 %
PLATELET # BLD AUTO: 313 X10(3)/MCL (ref 130–400)
PMV BLD AUTO: 9.3 FL (ref 7.4–10.4)
POTASSIUM SERPL-SCNC: 4.1 MMOL/L (ref 3.5–5.1)
PROT SERPL-MCNC: 7.4 GM/DL (ref 6.4–8.3)
RBC # BLD AUTO: 5.26 X10(6)/MCL (ref 4.7–6.1)
SODIUM SERPL-SCNC: 137 MMOL/L (ref 136–145)
TRIGL SERPL-MCNC: 218 MG/DL (ref 34–140)
TSH SERPL-ACNC: 0.68 UIU/ML (ref 0.35–4.94)
VLDLC SERPL CALC-MCNC: 44 MG/DL
WBC # SPEC AUTO: 11.09 X10(3)/MCL (ref 4.5–11.5)

## 2024-05-06 PROCEDURE — 80061 LIPID PANEL: CPT

## 2024-05-06 PROCEDURE — 84443 ASSAY THYROID STIM HORMONE: CPT

## 2024-05-06 PROCEDURE — 85025 COMPLETE CBC W/AUTO DIFF WBC: CPT

## 2024-05-06 PROCEDURE — 84403 ASSAY OF TOTAL TESTOSTERONE: CPT

## 2024-05-06 PROCEDURE — 80053 COMPREHEN METABOLIC PANEL: CPT

## 2024-05-06 PROCEDURE — 36415 COLL VENOUS BLD VENIPUNCTURE: CPT

## 2024-05-13 ENCOUNTER — HOSPITAL ENCOUNTER (OUTPATIENT)
Dept: RADIOLOGY | Facility: HOSPITAL | Age: 54
Discharge: HOME | End: 2024-05-13
Attending: FAMILY MEDICINE
Payer: COMMERCIAL

## 2024-05-13 DIAGNOSIS — M25.561 RIGHT KNEE PAIN: ICD-10-CM

## 2024-05-13 PROCEDURE — 73721 MRI JNT OF LWR EXTRE W/O DYE: CPT | Mod: TC,RT

## 2024-05-16 LAB
TESTOST FREE SERPL-MCNC: 9.77 NG/DL (ref 4.06–15.6)
TESTOST SERPL-MCNC: 296 NG/DL (ref 240–950)

## 2024-09-14 ENCOUNTER — OFFICE VISIT (OUTPATIENT)
Dept: URGENT CARE | Facility: CLINIC | Age: 54
End: 2024-09-14
Payer: COMMERCIAL

## 2024-09-14 VITALS
OXYGEN SATURATION: 97 % | HEART RATE: 85 BPM | TEMPERATURE: 98 F | WEIGHT: 260 LBS | BODY MASS INDEX: 36.4 KG/M2 | RESPIRATION RATE: 20 BRPM | SYSTOLIC BLOOD PRESSURE: 127 MMHG | DIASTOLIC BLOOD PRESSURE: 83 MMHG | HEIGHT: 71 IN

## 2024-09-14 DIAGNOSIS — J06.9 ACUTE URI: Primary | ICD-10-CM

## 2024-09-14 DIAGNOSIS — R09.89 SYMPTOMS OF UPPER RESPIRATORY INFECTION (URI): ICD-10-CM

## 2024-09-14 LAB
CTP QC/QA: YES
SARS-COV-2 AG RESP QL IA.RAPID: NEGATIVE

## 2024-09-14 PROCEDURE — 99214 OFFICE O/P EST MOD 30 MIN: CPT | Mod: PBBFAC

## 2024-09-14 PROCEDURE — 87811 SARS-COV-2 COVID19 W/OPTIC: CPT | Mod: PBBFAC

## 2024-09-14 PROCEDURE — 99213 OFFICE O/P EST LOW 20 MIN: CPT | Mod: S$PBB,,,

## 2024-09-14 NOTE — PROGRESS NOTES
"Subjective:       Patient ID: Austin Kaplan is a 53 y.o. male.    Vitals:  height is 5' 11" (1.803 m) and weight is 117.9 kg (260 lb). His oral temperature is 98.1 °F (36.7 °C). His blood pressure is 127/83 and his pulse is 85. His respiration is 20 and oxygen saturation is 97%.     Chief Complaint: URI (Cough, nasal congestion, body aches, headache and chills x 1 day.)    52 y/o white male with hx of HTN, requesting COVID screening today, states job is requesting testing. Reports symptoms last night which improved today with Tylenol. Denies any known ill contacts.     URI   Associated symptoms include congestion, coughing and rhinorrhea. Pertinent negatives include no diarrhea, ear pain, headaches, nausea, sore throat or vomiting.       Constitution: Positive for sweating.   HENT:  Positive for congestion. Negative for ear pain and sore throat.    Respiratory:  Positive for cough. Negative for sputum production and shortness of breath.    Gastrointestinal:  Negative for nausea, vomiting and diarrhea.   Musculoskeletal:  Positive for muscle ache.   Allergic/Immunologic: Negative for seasonal allergies.   Neurological:  Negative for headaches.       Objective:      Physical Exam   Constitutional: He is oriented to person, place, and time. He appears well-developed. He is cooperative. He is easily aroused.  Non-toxic appearance. He does not appear ill. No distress. well-groomedawake  HENT:   Ears:   Right Ear: impacted cerumen  Left Ear: impacted cerumen  Nose: Mucosal edema and rhinorrhea (clear) present. Right sinus exhibits no maxillary sinus tenderness and no frontal sinus tenderness. Left sinus exhibits no maxillary sinus tenderness and no frontal sinus tenderness.   Mouth/Throat: Uvula is midline, oropharynx is clear and moist and mucous membranes are normal. Tonsils are 0 on the right. Tonsils are 0 on the left.   Eyes: Lids are normal.   Cardiovascular: Normal rate, S1 normal, S2 normal and normal heart " sounds.   Pulmonary/Chest: Effort normal and breath sounds normal.   Abdominal: Normal appearance.   Musculoskeletal:      Right lower leg: No edema.      Left lower leg: No edema.   Neurological: He is alert, oriented to person, place, and time and easily aroused. Gait normal. GCS eye subscore is 4. GCS verbal subscore is 5. GCS motor subscore is 6.   Skin: Skin is warm, dry, intact and not diaphoretic. Capillary refill takes less than 2 seconds.   Psychiatric: His speech is normal and behavior is normal.   Nursing note and vitals reviewed.        Assessment:       1. Acute URI    2. Symptoms of upper respiratory infection (URI)          Plan:     COVID test is negative, will treat as URI, encouraged to increase oral fluids, continue with Tylenol as directed. Return to clinic if symptoms does not improve in 7 days.     Acute URI    Symptoms of upper respiratory infection (URI)  -     SARS Coronavirus 2 Antigen, POCT Manual Read           Results for orders placed or performed in visit on 09/14/24   SARS Coronavirus 2 Antigen, POCT Manual Read   Result Value Ref Range    SARS Coronavirus 2 Antigen Negative Negative     Acceptable Yes

## 2024-09-14 NOTE — LETTER
"Austin"Lorenza Kaplan was seen and treated in our department on 9/14/2024.     COVID-19 is present in our communities across the [facility]. In this situation, your employee meets the following criteria:    Austin Kaplan has met the criteria for COVID-19 testing and has a NEGATIVE result. The employee can return to work on next scheduled shift.     MEGA Romeroszay [facility]   "

## 2024-09-24 NOTE — Clinical Note
CVTS Note    Mediastinal chest tubes with minimal output over 24 hours.  Chest tubes removed and dressing placed.  Patient tolerated the procedure well.    Jc Tsai MD  Cardiothoracic Surgery Fellow     The anesthesia care team has confirmed the patient ID and re-evaluated the patient and anesthesia plan confirming it is suitable for the patient's condition and procedure.

## 2024-10-08 ENCOUNTER — OFFICE VISIT (OUTPATIENT)
Dept: URGENT CARE | Facility: CLINIC | Age: 54
End: 2024-10-08
Payer: COMMERCIAL

## 2024-10-08 VITALS
WEIGHT: 263.88 LBS | OXYGEN SATURATION: 98 % | HEART RATE: 108 BPM | RESPIRATION RATE: 20 BRPM | SYSTOLIC BLOOD PRESSURE: 122 MMHG | TEMPERATURE: 101 F | BODY MASS INDEX: 37.78 KG/M2 | DIASTOLIC BLOOD PRESSURE: 73 MMHG | HEIGHT: 70 IN

## 2024-10-08 DIAGNOSIS — L08.9 LOCALIZED INFECTION OF SKIN: Primary | ICD-10-CM

## 2024-10-08 PROCEDURE — 25000003 PHARM REV CODE 250

## 2024-10-08 PROCEDURE — 99214 OFFICE O/P EST MOD 30 MIN: CPT | Mod: PBBFAC | Performed by: NURSE PRACTITIONER

## 2024-10-08 PROCEDURE — 99213 OFFICE O/P EST LOW 20 MIN: CPT | Mod: S$PBB,,, | Performed by: NURSE PRACTITIONER

## 2024-10-08 RX ORDER — AMOXICILLIN 875 MG/1
875 TABLET, FILM COATED ORAL EVERY 12 HOURS
Qty: 20 TABLET | Refills: 0 | Status: ON HOLD | OUTPATIENT
Start: 2024-10-08 | End: 2024-10-18

## 2024-10-08 RX ORDER — ACETAMINOPHEN 500 MG
1000 TABLET ORAL
Status: COMPLETED | OUTPATIENT
Start: 2024-10-08 | End: 2024-10-08

## 2024-10-08 RX ADMIN — ACETAMINOPHEN 1000 MG: 500 TABLET ORAL at 03:10

## 2024-10-08 NOTE — PATIENT INSTRUCTIONS
Please follow instructions on patient education material.      Return to urgent care in 2 to 3 days if symptoms are not improving, immediately if you develop any new or worsening symptoms.       You have cellulitis. This is an infection of your skin and the fat tissue under the skin. This can happen with any small knick, scratch or cut in the skin, allowing bacteria to get in.  Start antibiotics today.  -Clean with antibacterial soap, pat dry  -Use healing ointment such as Aquaphor over-the-counter  - do not put hydrogen peroxide, rubbing alcohol, or any items from the kitchen into the wound  - take antibiotics as prescribed.  - follow up with your PCP next week for a wound check    - if you get fever, increasing pain, increasing drainage, go to the ER      Go to the ER if you experience chest pain with shortness of breath, shortness of breath when moving around your house, high fevers 103.0+, excessive vomiting/diarrhea, or general distress.

## 2024-10-08 NOTE — PROGRESS NOTES
"Subjective:      Patient ID: Austin Kaplan is a 53 y.o. male.    Vitals:  height is 5' 10" (1.778 m) and weight is 119.7 kg (263 lb 14.3 oz). His temperature is 100.5 °F (38.1 °C) (abnormal). His blood pressure is 122/73 and his pulse is 108. His respiration is 20 and oxygen saturation is 98%.     Chief Complaint: Wound Check (States had RT lower ext tattoo on Friday, having redness/pain x today)    As stated in CC. Pt reports pain and fever started today.  Patient reports taking Tylenol earlier this morning for fever which subsided.  Pt reports nasal congestion but this has been present chronically. Tattoo was given 6 days ago.  Patient denies cough, shortness or breath or chest pain, dizziness weakness, stomach pain, nausea or vomiting        Skin:  Positive for erythema.      Objective:     Physical Exam   Constitutional: He appears well-developed.  Non-toxic appearance. He does not appear ill. No distress.   HENT:   Head: Atraumatic.   Nose: No purulent discharge. Right sinus exhibits no maxillary sinus tenderness and no frontal sinus tenderness. Left sinus exhibits no maxillary sinus tenderness and no frontal sinus tenderness.   Mouth/Throat: Uvula is midline.   Eyes: Right eye exhibits no discharge. Left eye exhibits no discharge. Extraocular movement intact   Neck: Neck supple. No neck rigidity present.   Cardiovascular: Regular rhythm.   Pulmonary/Chest: Effort normal and breath sounds normal. No respiratory distress. He has no wheezes. He has no rhonchi. He has no rales.   Lymphadenopathy:     He has no cervical adenopathy.   Neurological: He is alert.   Skin: Skin is warm, dry, not diaphoretic and rash. erythema         Comments: Localizes swelling to large tatoo on right lateral leg.  No drainage, no farming abscess or skin induration   Psychiatric: His behavior is normal.   Nursing note and vitals reviewed.      Assessment:     1. Localized infection of skin        Plan:   Strict ER  precautions " given.  Encouraged patient to drink plenty of fluids hydrate and start medications as prescribed today.  Return to clinic for worsening or if symptoms do not subside.  Start antibiotics today  - do not put hydrogen peroxide, rubbing alcohol, or any items from the kitchen into the wound  - take antibiotics as prescribed  - if you get fever, increasing pain, increasing drainage, go to the ER.  - follow up with your PCP next week for a wound check  Localized infection of skin  -     acetaminophen tablet 1,000 mg  -     amoxicillin (AMOXIL) 875 MG tablet; Take 1 tablet (875 mg total) by mouth every 12 (twelve) hours. for 10 days  Dispense: 20 tablet; Refill: 0

## 2024-10-09 ENCOUNTER — HOSPITAL ENCOUNTER (EMERGENCY)
Facility: HOSPITAL | Age: 54
Discharge: HOME | End: 2024-10-09
Attending: EMERGENCY MEDICINE
Payer: COMMERCIAL

## 2024-10-09 VITALS
SYSTOLIC BLOOD PRESSURE: 110 MMHG | TEMPERATURE: 98 F | HEIGHT: 70 IN | BODY MASS INDEX: 37.5 KG/M2 | WEIGHT: 261.94 LBS | RESPIRATION RATE: 18 BRPM | HEART RATE: 89 BPM | OXYGEN SATURATION: 97 % | DIASTOLIC BLOOD PRESSURE: 69 MMHG

## 2024-10-09 DIAGNOSIS — L03.115 CELLULITIS OF RIGHT LOWER EXTREMITY: Primary | ICD-10-CM

## 2024-10-09 LAB
ALBUMIN SERPL-MCNC: 3.4 G/DL (ref 3.5–5)
ALBUMIN/GLOB SERPL: 1.1 RATIO (ref 1.1–2)
ALP SERPL-CCNC: 56 UNIT/L (ref 40–150)
ALT SERPL-CCNC: 12 UNIT/L (ref 0–55)
ANION GAP SERPL CALC-SCNC: 8 MEQ/L
APTT PPP: 33 SECONDS (ref 23.2–33.7)
AST SERPL-CCNC: 12 UNIT/L (ref 5–34)
BASOPHILS # BLD AUTO: 0.02 X10(3)/MCL
BASOPHILS NFR BLD AUTO: 0.2 %
BILIRUB SERPL-MCNC: 0.7 MG/DL
BUN SERPL-MCNC: 12.6 MG/DL (ref 8.4–25.7)
CALCIUM SERPL-MCNC: 9 MG/DL (ref 8.4–10.2)
CHLORIDE SERPL-SCNC: 103 MMOL/L (ref 98–107)
CO2 SERPL-SCNC: 25 MMOL/L (ref 22–29)
CREAT SERPL-MCNC: 0.67 MG/DL (ref 0.73–1.18)
CREAT/UREA NIT SERPL: 19
EOSINOPHIL # BLD AUTO: 0.3 X10(3)/MCL (ref 0–0.9)
EOSINOPHIL NFR BLD AUTO: 2.8 %
ERYTHROCYTE [DISTWIDTH] IN BLOOD BY AUTOMATED COUNT: 13.5 % (ref 11.5–17)
GFR SERPLBLD CREATININE-BSD FMLA CKD-EPI: >60 ML/MIN/1.73/M2
GLOBULIN SER-MCNC: 3 GM/DL (ref 2.4–3.5)
GLUCOSE SERPL-MCNC: 116 MG/DL (ref 74–100)
HCT VFR BLD AUTO: 39.9 % (ref 42–52)
HGB BLD-MCNC: 13.8 G/DL (ref 14–18)
HOLD SPECIMEN: NORMAL
IMM GRANULOCYTES # BLD AUTO: 0.03 X10(3)/MCL (ref 0–0.04)
IMM GRANULOCYTES NFR BLD AUTO: 0.3 %
INR PPP: 1.1
LACTATE SERPL-SCNC: 1.4 MMOL/L (ref 0.5–2.2)
LYMPHOCYTES # BLD AUTO: 0.89 X10(3)/MCL (ref 0.6–4.6)
LYMPHOCYTES NFR BLD AUTO: 8.5 %
MCH RBC QN AUTO: 32 PG (ref 27–31)
MCHC RBC AUTO-ENTMCNC: 34.6 G/DL (ref 33–36)
MCV RBC AUTO: 92.6 FL (ref 80–94)
MONOCYTES # BLD AUTO: 0.56 X10(3)/MCL (ref 0.1–1.3)
MONOCYTES NFR BLD AUTO: 5.3 %
NEUTROPHILS # BLD AUTO: 8.73 X10(3)/MCL (ref 2.1–9.2)
NEUTROPHILS NFR BLD AUTO: 82.9 %
NRBC BLD AUTO-RTO: 0 %
PLATELET # BLD AUTO: 212 X10(3)/MCL (ref 130–400)
PMV BLD AUTO: 9.4 FL (ref 7.4–10.4)
POTASSIUM SERPL-SCNC: 4.1 MMOL/L (ref 3.5–5.1)
PROT SERPL-MCNC: 6.4 GM/DL (ref 6.4–8.3)
PROTHROMBIN TIME: 14.4 SECONDS (ref 11.4–14)
RBC # BLD AUTO: 4.31 X10(6)/MCL (ref 4.7–6.1)
SODIUM SERPL-SCNC: 136 MMOL/L (ref 136–145)
WBC # BLD AUTO: 10.53 X10(3)/MCL (ref 4.5–11.5)

## 2024-10-09 PROCEDURE — 85730 THROMBOPLASTIN TIME PARTIAL: CPT | Performed by: PHYSICIAN ASSISTANT

## 2024-10-09 PROCEDURE — 85025 COMPLETE CBC W/AUTO DIFF WBC: CPT | Performed by: PHYSICIAN ASSISTANT

## 2024-10-09 PROCEDURE — 63600175 PHARM REV CODE 636 W HCPCS: Performed by: PHYSICIAN ASSISTANT

## 2024-10-09 PROCEDURE — 25000003 PHARM REV CODE 250: Performed by: PHYSICIAN ASSISTANT

## 2024-10-09 PROCEDURE — 96361 HYDRATE IV INFUSION ADD-ON: CPT

## 2024-10-09 PROCEDURE — 87040 BLOOD CULTURE FOR BACTERIA: CPT | Performed by: PHYSICIAN ASSISTANT

## 2024-10-09 PROCEDURE — 85610 PROTHROMBIN TIME: CPT | Performed by: PHYSICIAN ASSISTANT

## 2024-10-09 PROCEDURE — 96365 THER/PROPH/DIAG IV INF INIT: CPT

## 2024-10-09 PROCEDURE — 83605 ASSAY OF LACTIC ACID: CPT | Performed by: PHYSICIAN ASSISTANT

## 2024-10-09 PROCEDURE — 80053 COMPREHEN METABOLIC PANEL: CPT | Performed by: PHYSICIAN ASSISTANT

## 2024-10-09 PROCEDURE — 99284 EMERGENCY DEPT VISIT MOD MDM: CPT | Mod: 25

## 2024-10-09 RX ORDER — HYDROCODONE BITARTRATE AND ACETAMINOPHEN 7.5; 325 MG/1; MG/1
1 TABLET ORAL
Status: COMPLETED | OUTPATIENT
Start: 2024-10-09 | End: 2024-10-09

## 2024-10-09 RX ORDER — SULFAMETHOXAZOLE AND TRIMETHOPRIM 800; 160 MG/1; MG/1
2 TABLET ORAL 2 TIMES DAILY
Qty: 28 TABLET | Refills: 0 | Status: ON HOLD | OUTPATIENT
Start: 2024-10-09 | End: 2024-10-15 | Stop reason: HOSPADM

## 2024-10-09 RX ORDER — SULFAMETHOXAZOLE AND TRIMETHOPRIM 800; 160 MG/1; MG/1
2 TABLET ORAL
Status: COMPLETED | OUTPATIENT
Start: 2024-10-09 | End: 2024-10-09

## 2024-10-09 RX ORDER — TRIAMCINOLONE ACETONIDE 0.25 MG/G
CREAM TOPICAL 2 TIMES DAILY
Qty: 15 G | Refills: 0 | Status: SHIPPED | OUTPATIENT
Start: 2024-10-09 | End: 2024-10-16

## 2024-10-09 RX ADMIN — SODIUM CHLORIDE, POTASSIUM CHLORIDE, SODIUM LACTATE AND CALCIUM CHLORIDE 1000 ML: 600; 310; 30; 20 INJECTION, SOLUTION INTRAVENOUS at 02:10

## 2024-10-09 RX ADMIN — PIPERACILLIN SODIUM AND TAZOBACTAM SODIUM 4.5 G: 4; .5 INJECTION, POWDER, LYOPHILIZED, FOR SOLUTION INTRAVENOUS at 02:10

## 2024-10-09 RX ADMIN — SULFAMETHOXAZOLE AND TRIMETHOPRIM 2 TABLET: 800; 160 TABLET ORAL at 03:10

## 2024-10-09 RX ADMIN — HYDROCODONE BITARTRATE AND ACETAMINOPHEN 1 TABLET: 7.5; 325 TABLET ORAL at 03:10

## 2024-10-09 NOTE — ED PROVIDER NOTES
Encounter Date: 10/9/2024     History     Chief Complaint   Patient presents with    Leg Pain     Patient reports right leg pain from recent tattoo Friday. Patient states that he started with pain yesterday on 10/8/24 and was seen at urgent care and was put on antibiotics.      Patient with pmhx of HTN and bipolar disorder presents today c/o right lower extremity pain and redness since yesterday. Patient reports getting a tattoo 5 days ago at a tattoo shop he has used for years. Yesterday he woke up with pain overlying the tattoo and wife noticed associated erythema. He went to an urgent care yesterday for evaluation and was prescribed augmentin. Patient says he has taken 2 doses total, but symptoms have progressively worsened. He denies fever or chills.     The history is provided by the patient. No  was used.     Review of patient's allergies indicates:   Allergen Reactions    Peg-electrolyte soln Hives and Itching    Venlafaxine      Other reaction(s): High blood pressure     Past Medical History:   Diagnosis Date    Basal cell carcinoma     Bipolar disorder     HTN (hypertension)     Sleep apnea     CPAP     Past Surgical History:   Procedure Laterality Date    COLONOSCOPY N/A 8/3/2023    Procedure: COLONOSCOPY;  Surgeon: Guille Francis MD;  Location: [facility];  Service: Endoscopy;  Laterality: N/A;  DIVERTICULOSIS OF THE SIGMOID; NORMAL ILEUM; GRADE 2 INTERNAL HEMORRHOIDS, RECTAL POLYP      COLONOSCOPY, WITH POLYPECTOMY USING SNARE N/A 8/3/2023    Procedure: COLONOSCOPY, WITH POLYPECTOMY USING SNARE;  Surgeon: Guille Francis MD;  Location: [facility] ENDO;  Service: Endoscopy;  Laterality: N/A;  RECTAL POLYP, 7CM IN RECTUM    EXCISION OF LESION Left 4/17/2023    Procedure: EXCISION, LESION (Scalp leison left post);  Surgeon: Guille Francis MD;  Location: [facility] OR;  Service: General;  Laterality: Left;    INGUINAL HERNIA REPAIR Right     TONSILLECTOMY      TYMPANOSTOMY TUBE PLACEMENT        Family History   Problem Relation Name Age of Onset    Hypertension Mother      Heart attack Mother      Diabetes Father       Social History     Tobacco Use    Smoking status: Former     Current packs/day: 1.00     Types: Cigarettes    Smokeless tobacco: Former     Types: Chew   Substance Use Topics    Alcohol use: Not Currently     Comment: Socially    Drug use: Not Currently     Types: Marijuana     Review of Systems   Constitutional:  Negative for chills and fever.   Respiratory:  Negative for cough and shortness of breath.    Cardiovascular:  Positive for leg swelling. Negative for chest pain.   Gastrointestinal:  Negative for abdominal pain, diarrhea and vomiting.   Genitourinary:  Negative for dysuria, flank pain and hematuria.   Musculoskeletal:  Negative for back pain and neck pain.   Skin:  Positive for wound.   Neurological:  Negative for syncope, light-headedness and headaches.   All other systems reviewed and are negative.      Physical Exam     Initial Vitals [10/09/24 1302]   BP Pulse Resp Temp SpO2   122/77 100 18 97.6 °F (36.4 °C) 100 %      MAP       --         Physical Exam    Vitals reviewed.  Constitutional: He is not diaphoretic. No distress.   HENT:   Head: Normocephalic and atraumatic. Mouth/Throat: Oropharynx is clear and moist. No oropharyngeal exudate.   Eyes: Conjunctivae and EOM are normal.   Neck: Neck supple.   Cardiovascular:  Normal rate, regular rhythm, normal heart sounds and intact distal pulses.           Pulmonary/Chest: Breath sounds normal. No respiratory distress.   Abdominal: He exhibits no distension.   Musculoskeletal:      Cervical back: Neck supple.     Neurological: He is alert and oriented to person, place, and time. GCS score is 15. GCS eye subscore is 4. GCS verbal subscore is 5. GCS motor subscore is 6.   Skin: Skin is warm and dry. Capillary refill takes less than 2 seconds. No rash noted.   Large tattoo noted to lateral aspect of right distal lower extremity.  Involved soft tissues are edematous and weeping. Some purulent drainage noted. There is surrounding erythema. Tender to palpation.    Psychiatric: He has a normal mood and affect.                     ED Course   Procedures  Labs Reviewed   COMPREHENSIVE METABOLIC PANEL - Abnormal       Result Value    Sodium 136      Potassium 4.1      Chloride 103      CO2 25      Glucose 116 (*)     Blood Urea Nitrogen 12.6      Creatinine 0.67 (*)     Calcium 9.0      Protein Total 6.4      Albumin 3.4 (*)     Globulin 3.0      Albumin/Globulin Ratio 1.1      Bilirubin Total 0.7      ALP 56      ALT 12      AST 12      eGFR >60      Anion Gap 8.0      BUN/Creatinine Ratio 19     PROTIME-INR - Abnormal    PT 14.4 (*)     INR 1.1     CBC WITH DIFFERENTIAL - Abnormal    WBC 10.53      RBC 4.31 (*)     Hgb 13.8 (*)     Hct 39.9 (*)     MCV 92.6      MCH 32.0 (*)     MCHC 34.6      RDW 13.5      Platelet 212      MPV 9.4      Neut % 82.9      Lymph % 8.5      Mono % 5.3      Eos % 2.8      Basophil % 0.2      Lymph # 0.89      Neut # 8.73      Mono # 0.56      Eos # 0.30      Baso # 0.02      IG# 0.03      IG% 0.3      NRBC% 0.0     LACTIC ACID, PLASMA - Normal    Lactic Acid Level 1.4     APTT - Normal    PTT 33.0     BLOOD CULTURE OLG   BLOOD CULTURE OLG   CBC W/ AUTO DIFFERENTIAL    Narrative:     The following orders were created for panel order CBC Auto Differential.  Procedure                               Abnormality         Status                     ---------                               -----------         ------                     CBC with Differential[[id number]]       Abnormal            Final result                 Please view results for these tests on the individual orders.   EXTRA TUBES    Narrative:     The following orders were created for panel order EXTRA TUBES.  Procedure                               Abnormality         Status                     ---------                               -----------          ------                     Light Green Top Hold[[id number]]                            Final result               Lavender Top Hold[[id number]]                               Final result               Gold Top Hold[[id number]]                                   Final result                 Please view results for these tests on the individual orders.   LIGHT GREEN TOP HOLD    Extra Tube Hold for add-ons.     LAVENDER TOP HOLD    Extra Tube Hold for add-ons.     GOLD TOP HOLD    Extra Tube Hold for add-ons.            Imaging Results              X-Ray Tibia Fibula 2 View Right (Final result)  Result time 10/09/24 14:41:52      Final result by Negrito Wilhelm MD (10/09/24 14:41:52)                   Impression:      Degenerative changes.    No fractures or dislocations are clearly identified.      Electronically signed by: Negrito Wilhelm  Date:    10/09/2024  Time:    14:41               Narrative:    EXAMINATION:  XR TIBIA FIBULA 2 VIEW RIGHT    CLINICAL HISTORY:  Pain in leg, unspecified    COMPARISON:  None.    FINDINGS:  No acute displaced fractures or dislocations.    There are degenerative changes of the lateral compartment of the knee joint with sclerosis and marginal osteophytosis some degenerative changes seen of the patellofemoral joint articular spaces are otherwise preserved with smooth articular surfaces    No blastic or lytic lesions.    Soft tissues within normal limits.                                       Medications   lactated ringers bolus 1,000 mL (1,000 mLs Intravenous New Bag 10/9/24 1409)   sulfamethoxazole-trimethoprim 800-160mg per tablet 2 tablet (has no administration in time range)   piperacillin-tazobactam (ZOSYN) 4.5 g in D5W 100 mL IVPB (MB+) (4.5 g Intravenous New Bag 10/9/24 1416)     Medical Decision Making  Ddx: cellulitis, erysipelas, contact dermatitis, amongst others     Amount and/or Complexity of Data Reviewed  Labs: ordered. Decision-making details documented in ED  Course.  Radiology: ordered. Decision-making details documented in ED Course.    Risk  Prescription drug management.  Risk Details: Given strict ED return precautions. I have spoken with the patient and/or caregivers. I have explained the patient's condition, diagnoses and treatment plan based on the information available to me at this time. I have answered the patient's and/or caregiver's questions and addressed any concerns. The patient and/or caregivers have as good an understanding of the patient's diagnosis, condition and treatment plan as can be expected at this point. The vital signs have been stable. The patient's condition is stable and appropriate for discharge from the emergency department.      The patient will pursue further outpatient evaluation with the primary care physician or other designated or consulting physician as outlined in the discharge instructions. The patient and/or caregivers are agreeable to this plan of care and follow-up instructions have been explained in detail. The patient and/or caregivers have received these instructions in written format and have expressed an understanding of the discharge instructions. The patient and/or caregivers are aware that any significant change in condition or worsening of symptoms should prompt an immediate return to this or the closest emergency department or a call to 911.               ED Course as of 10/09/24 1507   Wed Oct 09, 2024   1402 WBC: 10.53 [SA]   1402 Hemoglobin(!): 13.8 [SA]   1402 Hematocrit(!): 39.9 [SA]   1402 Platelet Count: 212 [SA]   1420 BUN: 12.6 [SA]   1421 Creatinine(!): 0.67 [SA]   1421 Lactic Acid Level: 1.4 [SA]   1421 INR: 1.1 [SA]   1421 PTT: 33.0 [SA]   1449 X-Ray Tibia Fibula 2 View Right [SA]   1453 Labs and x-ray reviewed. No acute abnormalities. Patient does not meet SIRS criteria. Findings could be suggestive of cellulitis vs possible reaction to dye. Recommend addition of bactrim and trial of topical steroids.  Wound care discussed. Advised follow up with PCP or return to ED in 2-3 days for wound check. Stable for discharge. ED precautions given.  [SA]      ED Course User Index  [SA] Venice Barclay PA                       Clinical Impression:  Final diagnoses:  [L03.115] Cellulitis of right lower extremity (Primary)        ED Disposition Condition    Discharge Stable          ED Prescriptions       Medication Sig Dispense Start Date End Date Auth. Provider    sulfamethoxazole-trimethoprim 800-160mg (BACTRIM DS) 800-160 mg Tab Take 2 tablets by mouth 2 (two) times daily. for 7 days 28 tablet 10/9/2024 10/16/2024 Venice Barclay PA    triamcinolone acetonide 0.025% (KENALOG) 0.025 % cream Apply topically 2 (two) times daily. for 7 days 15 g 10/9/2024 10/16/2024 Venice Barclay PA          Follow-up Information       Follow up With Specialties Details Why Contact Info    [facility] Emergency Dept Emergency Medicine  If symptoms worsen return to ED immediately [address] [address] [phone]    Mike Castro MD Family Medicine In 2 days For wound re-check [address]  [address]  [place] [address] [phone]      [facility] Emergency Dept Emergency Medicine In 2 days If symptoms worsen return to ED immediately, For wound re-check [address] [address] [phone]             Venice Barclay PA  10/09/24 8257

## 2024-10-09 NOTE — DISCHARGE INSTRUCTIONS
The wound on your leg needs to be re-evaluated in 2-3 days. You can follow up with your primary care provider or return to the ED. If symptoms worsen you should return to the ED immediately.     It is important that you follow up with your primary care provider or specialist if indicated for further evaluation, workup, and treatment as necessary. The exam and treatment you received in Emergency Department was for an urgent problem and NOT INTENDED AS COMPLETE CARE. It is important that you FOLLOW UP with a doctor for ongoing care. If your symptoms become WORSE or you DO NOT IMPROVE and you are unable to reach your health care provider, you should RETURN to the Emergency Department. The Emergency Department provider has provided a PRELIMINARY INTERPRETATION of all your studies. A final interpretation may be done after you are discharged. If a change in your diagnosis or treatment is needed WE WILL CONTACT YOU. It is critical that we have a CURRENT PHONE NUMBER FOR YOU.

## 2024-10-11 ENCOUNTER — HOSPITAL ENCOUNTER (INPATIENT)
Facility: HOSPITAL | Age: 54
LOS: 4 days | Discharge: HOME | DRG: 603 | End: 2024-10-15
Attending: FAMILY MEDICINE | Admitting: FAMILY MEDICINE
Payer: COMMERCIAL

## 2024-10-11 DIAGNOSIS — L03.115 CELLULITIS OF RIGHT LOWER EXTREMITY: Primary | ICD-10-CM

## 2024-10-11 DIAGNOSIS — R07.9 CHEST PAIN: ICD-10-CM

## 2024-10-11 DIAGNOSIS — L03.115 CELLULITIS OF RIGHT LEG: ICD-10-CM

## 2024-10-11 DIAGNOSIS — T78.49XA ALLERGIC REACTION TO TATTOO INK: ICD-10-CM

## 2024-10-11 PROBLEM — Z12.11 SCREEN FOR COLON CANCER: Status: RESOLVED | Noted: 2023-08-03 | Resolved: 2024-10-11

## 2024-10-11 PROBLEM — I10 HYPERTENSION: Status: ACTIVE | Noted: 2024-10-11

## 2024-10-11 PROBLEM — L03.119 CELLULITIS OF LEG: Status: ACTIVE | Noted: 2024-10-11

## 2024-10-11 PROBLEM — D23.4 BENIGN NEOPLASM OF SCALP AND SKIN OF NECK: Status: RESOLVED | Noted: 2023-04-17 | Resolved: 2024-10-11

## 2024-10-11 LAB
ALBUMIN SERPL-MCNC: 3.4 G/DL (ref 3.5–5)
ALBUMIN/GLOB SERPL: 1.1 RATIO (ref 1.1–2)
ALP SERPL-CCNC: 54 UNIT/L (ref 40–150)
ALT SERPL-CCNC: 21 UNIT/L (ref 0–55)
ANION GAP SERPL CALC-SCNC: 10 MEQ/L
AST SERPL-CCNC: 16 UNIT/L (ref 5–34)
BASOPHILS # BLD AUTO: 0.03 X10(3)/MCL
BASOPHILS NFR BLD AUTO: 0.4 %
BILIRUB SERPL-MCNC: 0.4 MG/DL
BUN SERPL-MCNC: 8 MG/DL (ref 8.4–25.7)
CALCIUM SERPL-MCNC: 8.9 MG/DL (ref 8.4–10.2)
CHLORIDE SERPL-SCNC: 106 MMOL/L (ref 98–107)
CO2 SERPL-SCNC: 23 MMOL/L (ref 22–29)
CREAT SERPL-MCNC: 0.78 MG/DL (ref 0.73–1.18)
CREAT/UREA NIT SERPL: 10
EOSINOPHIL # BLD AUTO: 0.34 X10(3)/MCL (ref 0–0.9)
EOSINOPHIL NFR BLD AUTO: 4.7 %
ERYTHROCYTE [DISTWIDTH] IN BLOOD BY AUTOMATED COUNT: 13.5 % (ref 11.5–17)
GFR SERPLBLD CREATININE-BSD FMLA CKD-EPI: >60 ML/MIN/1.73/M2
GLOBULIN SER-MCNC: 3.2 GM/DL (ref 2.4–3.5)
GLUCOSE SERPL-MCNC: 162 MG/DL (ref 74–100)
HCT VFR BLD AUTO: 39.4 % (ref 42–52)
HGB BLD-MCNC: 13.4 G/DL (ref 14–18)
IMM GRANULOCYTES # BLD AUTO: 0.02 X10(3)/MCL (ref 0–0.04)
IMM GRANULOCYTES NFR BLD AUTO: 0.3 %
LYMPHOCYTES # BLD AUTO: 1.3 X10(3)/MCL (ref 0.6–4.6)
LYMPHOCYTES NFR BLD AUTO: 18 %
MCH RBC QN AUTO: 31.2 PG (ref 27–31)
MCHC RBC AUTO-ENTMCNC: 34 G/DL (ref 33–36)
MCV RBC AUTO: 91.6 FL (ref 80–94)
MONOCYTES # BLD AUTO: 0.54 X10(3)/MCL (ref 0.1–1.3)
MONOCYTES NFR BLD AUTO: 7.5 %
NEUTROPHILS # BLD AUTO: 5.01 X10(3)/MCL (ref 2.1–9.2)
NEUTROPHILS NFR BLD AUTO: 69.1 %
PLATELET # BLD AUTO: 267 X10(3)/MCL (ref 130–400)
PMV BLD AUTO: 9.3 FL (ref 7.4–10.4)
POTASSIUM SERPL-SCNC: 3.9 MMOL/L (ref 3.5–5.1)
PROT SERPL-MCNC: 6.6 GM/DL (ref 6.4–8.3)
RBC # BLD AUTO: 4.3 X10(6)/MCL (ref 4.7–6.1)
SODIUM SERPL-SCNC: 139 MMOL/L (ref 136–145)
TSH SERPL-ACNC: 1.18 UIU/ML (ref 0.35–4.94)
WBC # BLD AUTO: 7.24 X10(3)/MCL (ref 4.5–11.5)

## 2024-10-11 PROCEDURE — 80053 COMPREHEN METABOLIC PANEL: CPT | Performed by: FAMILY MEDICINE

## 2024-10-11 PROCEDURE — 36415 COLL VENOUS BLD VENIPUNCTURE: CPT | Performed by: FAMILY MEDICINE

## 2024-10-11 PROCEDURE — 63600175 PHARM REV CODE 636 W HCPCS: Performed by: FAMILY MEDICINE

## 2024-10-11 PROCEDURE — 85025 COMPLETE CBC W/AUTO DIFF WBC: CPT | Performed by: FAMILY MEDICINE

## 2024-10-11 PROCEDURE — 87040 BLOOD CULTURE FOR BACTERIA: CPT | Performed by: FAMILY MEDICINE

## 2024-10-11 PROCEDURE — 11000001 HC ACUTE MED/SURG PRIVATE ROOM

## 2024-10-11 PROCEDURE — 84443 ASSAY THYROID STIM HORMONE: CPT | Performed by: FAMILY MEDICINE

## 2024-10-11 PROCEDURE — 25000003 PHARM REV CODE 250: Performed by: FAMILY MEDICINE

## 2024-10-11 RX ORDER — ARIPIPRAZOLE 5 MG/1
10 TABLET ORAL NIGHTLY
Status: DISCONTINUED | OUTPATIENT
Start: 2024-10-12 | End: 2024-10-15 | Stop reason: HOSPADM

## 2024-10-11 RX ORDER — GLUCAGON 1 MG
1 KIT INJECTION
Status: DISCONTINUED | OUTPATIENT
Start: 2024-10-11 | End: 2024-10-15 | Stop reason: HOSPADM

## 2024-10-11 RX ORDER — ACETAMINOPHEN 325 MG/1
650 TABLET ORAL EVERY 8 HOURS PRN
Status: DISCONTINUED | OUTPATIENT
Start: 2024-10-11 | End: 2024-10-15 | Stop reason: HOSPADM

## 2024-10-11 RX ORDER — PANTOPRAZOLE SODIUM 40 MG/1
40 TABLET, DELAYED RELEASE ORAL DAILY
Status: DISCONTINUED | OUTPATIENT
Start: 2024-10-11 | End: 2024-10-15 | Stop reason: HOSPADM

## 2024-10-11 RX ORDER — HYDROCODONE BITARTRATE AND ACETAMINOPHEN 5; 325 MG/1; MG/1
1 TABLET ORAL EVERY 6 HOURS PRN
Status: DISCONTINUED | OUTPATIENT
Start: 2024-10-11 | End: 2024-10-15 | Stop reason: HOSPADM

## 2024-10-11 RX ORDER — ENOXAPARIN SODIUM 100 MG/ML
40 INJECTION SUBCUTANEOUS EVERY 24 HOURS
Status: DISCONTINUED | OUTPATIENT
Start: 2024-10-11 | End: 2024-10-15 | Stop reason: HOSPADM

## 2024-10-11 RX ORDER — LANOLIN ALCOHOL/MO/W.PET/CERES
800 CREAM (GRAM) TOPICAL
Status: DISCONTINUED | OUTPATIENT
Start: 2024-10-11 | End: 2024-10-15 | Stop reason: HOSPADM

## 2024-10-11 RX ORDER — HYDROXYZINE HYDROCHLORIDE 25 MG/1
50 TABLET, FILM COATED ORAL 3 TIMES DAILY
Status: DISCONTINUED | OUTPATIENT
Start: 2024-10-11 | End: 2024-10-15 | Stop reason: HOSPADM

## 2024-10-11 RX ORDER — LITHIUM CARBONATE 300 MG/1
900 TABLET, FILM COATED, EXTENDED RELEASE ORAL NIGHTLY
Status: DISCONTINUED | OUTPATIENT
Start: 2024-10-12 | End: 2024-10-15 | Stop reason: HOSPADM

## 2024-10-11 RX ORDER — VALSARTAN 80 MG/1
160 TABLET ORAL 2 TIMES DAILY
Status: DISCONTINUED | OUTPATIENT
Start: 2024-10-11 | End: 2024-10-11

## 2024-10-11 RX ORDER — TALC
6 POWDER (GRAM) TOPICAL NIGHTLY PRN
Status: DISCONTINUED | OUTPATIENT
Start: 2024-10-11 | End: 2024-10-15 | Stop reason: HOSPADM

## 2024-10-11 RX ORDER — DULOXETIN HYDROCHLORIDE 60 MG/1
60 CAPSULE, DELAYED RELEASE ORAL EVERY MORNING
Status: DISCONTINUED | OUTPATIENT
Start: 2024-10-12 | End: 2024-10-15 | Stop reason: HOSPADM

## 2024-10-11 RX ORDER — HYDROCHLOROTHIAZIDE 25 MG/1
25 TABLET ORAL DAILY
Status: DISCONTINUED | OUTPATIENT
Start: 2024-10-12 | End: 2024-10-15 | Stop reason: HOSPADM

## 2024-10-11 RX ORDER — VALSARTAN AND HYDROCHLOROTHIAZIDE 160; 25 MG/1; MG/1
1 TABLET ORAL EVERY MORNING
Status: DISCONTINUED | OUTPATIENT
Start: 2024-10-12 | End: 2024-10-11

## 2024-10-11 RX ORDER — ACETAMINOPHEN 325 MG/1
650 TABLET ORAL EVERY 4 HOURS PRN
Status: DISCONTINUED | OUTPATIENT
Start: 2024-10-11 | End: 2024-10-15 | Stop reason: HOSPADM

## 2024-10-11 RX ORDER — ONDANSETRON HYDROCHLORIDE 2 MG/ML
4 INJECTION, SOLUTION INTRAVENOUS EVERY 8 HOURS PRN
Status: DISCONTINUED | OUTPATIENT
Start: 2024-10-11 | End: 2024-10-15 | Stop reason: HOSPADM

## 2024-10-11 RX ORDER — VALSARTAN 80 MG/1
160 TABLET ORAL DAILY
Status: DISCONTINUED | OUTPATIENT
Start: 2024-10-12 | End: 2024-10-15 | Stop reason: HOSPADM

## 2024-10-11 RX ORDER — SIMETHICONE 80 MG
1 TABLET,CHEWABLE ORAL 4 TIMES DAILY PRN
Status: DISCONTINUED | OUTPATIENT
Start: 2024-10-11 | End: 2024-10-15 | Stop reason: HOSPADM

## 2024-10-11 RX ORDER — NALOXONE HCL 0.4 MG/ML
0.02 VIAL (ML) INJECTION
Status: DISCONTINUED | OUTPATIENT
Start: 2024-10-11 | End: 2024-10-15 | Stop reason: HOSPADM

## 2024-10-11 RX ORDER — BISACODYL 10 MG/1
10 SUPPOSITORY RECTAL DAILY PRN
Status: DISCONTINUED | OUTPATIENT
Start: 2024-10-11 | End: 2024-10-15 | Stop reason: HOSPADM

## 2024-10-11 RX ORDER — HYDROCHLOROTHIAZIDE 25 MG/1
25 TABLET ORAL DAILY
Status: DISCONTINUED | OUTPATIENT
Start: 2024-10-11 | End: 2024-10-11

## 2024-10-11 RX ADMIN — HYDROXYZINE HYDROCHLORIDE 50 MG: 25 TABLET, FILM COATED ORAL at 03:10

## 2024-10-11 RX ADMIN — HYDROXYZINE HYDROCHLORIDE 50 MG: 25 TABLET, FILM COATED ORAL at 08:10

## 2024-10-11 RX ADMIN — ENOXAPARIN SODIUM 40 MG: 40 INJECTION SUBCUTANEOUS at 04:10

## 2024-10-11 RX ADMIN — PANTOPRAZOLE SODIUM 40 MG: 40 TABLET, DELAYED RELEASE ORAL at 03:10

## 2024-10-11 RX ADMIN — ACETAMINOPHEN 650 MG: 325 TABLET, FILM COATED ORAL at 08:10

## 2024-10-11 RX ADMIN — ACETAMINOPHEN 650 MG: 325 TABLET, FILM COATED ORAL at 04:10

## 2024-10-11 RX ADMIN — DEXTROSE MONOHYDRATE 2000 MG: 50 INJECTION, SOLUTION INTRAVENOUS at 04:10

## 2024-10-11 NOTE — SUBJECTIVE & OBJECTIVE
Past Medical History:   Diagnosis Date    Basal cell carcinoma     Bipolar disorder     HTN (hypertension)     Sleep apnea     CPAP       Past Surgical History:   Procedure Laterality Date    COLONOSCOPY N/A 8/3/2023    Procedure: COLONOSCOPY;  Surgeon: Guille Francis MD;  Location: [facility] ENDO;  Service: Endoscopy;  Laterality: N/A;  DIVERTICULOSIS OF THE SIGMOID; NORMAL ILEUM; GRADE 2 INTERNAL HEMORRHOIDS, RECTAL POLYP      COLONOSCOPY, WITH POLYPECTOMY USING SNARE N/A 8/3/2023    Procedure: COLONOSCOPY, WITH POLYPECTOMY USING SNARE;  Surgeon: Guille Francis MD;  Location: [facility] ENDO;  Service: Endoscopy;  Laterality: N/A;  RECTAL POLYP, 7CM IN RECTUM    EXCISION OF LESION Left 4/17/2023    Procedure: EXCISION, LESION (Scalp leison left post);  Surgeon: Guille Francis MD;  Location: [facility] OR;  Service: General;  Laterality: Left;    INGUINAL HERNIA REPAIR Right     TONSILLECTOMY      TYMPANOSTOMY TUBE PLACEMENT         Review of patient's allergies indicates:   Allergen Reactions    Peg-electrolyte soln Hives and Itching    Venlafaxine      Other reaction(s): High blood pressure       No current facility-administered medications on file prior to encounter.     Current Outpatient Medications on File Prior to Encounter   Medication Sig    amoxicillin (AMOXIL) 875 MG tablet Take 1 tablet (875 mg total) by mouth every 12 (twelve) hours. for 10 days    ARIPiprazole (ABILIFY) 10 MG Tab Take 10 mg by mouth every evening.    cloNIDine (CATAPRES) 0.1 MG tablet Take by mouth as needed.    DULoxetine (CYMBALTA) 60 MG capsule Take 1 capsule by mouth every morning.    lithium (ESKALITH) 450 MG TbSR Take 900 mg by mouth every evening.    sulfamethoxazole-trimethoprim 800-160mg (BACTRIM DS) 800-160 mg Tab Take 2 tablets by mouth 2 (two) times daily. for 7 days    triamcinolone acetonide 0.025% (KENALOG) 0.025 % cream Apply topically 2 (two) times daily. for 7 days    valsartan-hydrochlorothiazide (DIOVAN-HCT) 160-25 mg  per tablet Take 1 tablet by mouth every morning.    multivitamin (THERAGRAN) per tablet Take 1 tablet by mouth every morning. (Patient not taking: Reported on 10/8/2024)     Family History       Problem Relation (Age of Onset)    Diabetes Father    Heart attack Mother    Hypertension Mother          Tobacco Use    Smoking status: Former     Current packs/day: 1.00     Types: Cigarettes    Smokeless tobacco: Former     Types: Chew   Substance and Sexual Activity    Alcohol use: Not Currently     Comment: Socially    Drug use: Not Currently     Types: Marijuana    Sexual activity: Not Currently     Review of Systems   Constitutional:  Positive for fever. Negative for activity change and fatigue.   HENT: Negative.     Eyes: Negative.    Respiratory: Negative.     Cardiovascular: Negative.    Gastrointestinal: Negative.    Endocrine: Negative.    Genitourinary: Negative.    Musculoskeletal: Negative.    Skin:         Redness over the right lower extremity with pustules crusting erythema.  1+ swelling   Neurological: Negative.    Hematological: Negative.    Psychiatric/Behavioral: Negative.       Objective:     Vital Signs (Most Recent):    Vital Signs (24h Range):           There is no height or weight on file to calculate BMI.     Physical Exam           Significant Labs: All pertinent labs within the past 24 hours have been reviewed.    Significant Imaging: I have reviewed all pertinent imaging results/findings within the past 24 hours.

## 2024-10-11 NOTE — H&P
[facility] Medicine  History & Physical    Patient Name: Austin Kaplan  MRN: [id number]  Patient Class: IP- Inpatient  Admission Date: 10/11/2024  Attending Physician: Mike Castro MD   Primary Care Provider: Mike Castro MD         Patient information was obtained from patient and ER records.     Subjective:     Principal Problem:Cellulitis of leg    Chief Complaint:   Chief Complaint   Patient presents with    Recurrent Skin Infections        HPI: The patient is a 53-year-old known to me from clinic.  With a history of high blood pressure and bipolar disorder.  Recently got a tattoo to his lower extremity.  For proximally two days the area became red and inflamed started having blistering with pustules and crusting.  He got once in the emergency room and initially got antibiotics in worsened he was seen in my clinic today was given additional antibiotics he has since had an extension of the redness and has had fever.  He is admitted to the hospital for IV antibiotic therapy.  Currently says the pain is controlled just some swelling.  No nausea or vomiting no shortness a breath no chest pain at this time no altered mental status.    Past Medical History:   Diagnosis Date    Basal cell carcinoma     Bipolar disorder     HTN (hypertension)     Sleep apnea     CPAP       Past Surgical History:   Procedure Laterality Date    COLONOSCOPY N/A 8/3/2023    Procedure: COLONOSCOPY;  Surgeon: Guille Francis MD;  Location: [facility];  Service: Endoscopy;  Laterality: N/A;  DIVERTICULOSIS OF THE SIGMOID; NORMAL ILEUM; GRADE 2 INTERNAL HEMORRHOIDS, RECTAL POLYP      COLONOSCOPY, WITH POLYPECTOMY USING SNARE N/A 8/3/2023    Procedure: COLONOSCOPY, WITH POLYPECTOMY USING SNARE;  Surgeon: Guille Francis MD;  Location: [facility];  Service: Endoscopy;  Laterality: N/A;  RECTAL POLYP, 7CM IN RECTUM    EXCISION OF LESION Left 4/17/2023    Procedure: EXCISION, LESION  (Scalp leison left post);  Surgeon: Guille Francis MD;  Location: [facility];  Service: General;  Laterality: Left;    INGUINAL HERNIA REPAIR Right     TONSILLECTOMY      TYMPANOSTOMY TUBE PLACEMENT         Review of patient's allergies indicates:   Allergen Reactions    Peg-electrolyte soln Hives and Itching    Venlafaxine      Other reaction(s): High blood pressure       No current facility-administered medications on file prior to encounter.     Current Outpatient Medications on File Prior to Encounter   Medication Sig    amoxicillin (AMOXIL) 875 MG tablet Take 1 tablet (875 mg total) by mouth every 12 (twelve) hours. for 10 days    ARIPiprazole (ABILIFY) 10 MG Tab Take 10 mg by mouth every evening.    cloNIDine (CATAPRES) 0.1 MG tablet Take by mouth as needed.    DULoxetine (CYMBALTA) 60 MG capsule Take 1 capsule by mouth every morning.    lithium (ESKALITH) 450 MG TbSR Take 900 mg by mouth every evening.    sulfamethoxazole-trimethoprim 800-160mg (BACTRIM DS) 800-160 mg Tab Take 2 tablets by mouth 2 (two) times daily. for 7 days    triamcinolone acetonide 0.025% (KENALOG) 0.025 % cream Apply topically 2 (two) times daily. for 7 days    valsartan-hydrochlorothiazide (DIOVAN-HCT) 160-25 mg per tablet Take 1 tablet by mouth every morning.    multivitamin (THERAGRAN) per tablet Take 1 tablet by mouth every morning. (Patient not taking: Reported on 10/8/2024)     Family History       Problem Relation (Age of Onset)    Diabetes Father    Heart attack Mother    Hypertension Mother          Tobacco Use    Smoking status: Former     Current packs/day: 1.00     Types: Cigarettes    Smokeless tobacco: Former     Types: Chew   Substance and Sexual Activity    Alcohol use: Not Currently     Comment: Socially    Drug use: Not Currently     Types: Marijuana    Sexual activity: Not Currently     Review of Systems   Constitutional:  Positive for fever. Negative for activity change and fatigue.   HENT: Negative.     Eyes:  Negative.    Respiratory: Negative.     Cardiovascular: Negative.    Gastrointestinal: Negative.    Endocrine: Negative.    Genitourinary: Negative.    Musculoskeletal: Negative.    Skin:         Redness over the right lower extremity with pustules crusting erythema.  1+ swelling   Neurological: Negative.    Hematological: Negative.    Psychiatric/Behavioral: Negative.       Objective:     Vital Signs (Most Recent):    Vital Signs (24h Range):           There is no height or weight on file to calculate BMI.     Physical Exam           Significant Labs: All pertinent labs within the past 24 hours have been reviewed.    Significant Imaging: I have reviewed all pertinent imaging results/findings within the past 24 hours.  Assessment/Plan:     * Cellulitis of leg  I have ordered blood culture CBC chemistry.  This may be equal contributions of infection and allergic reaction sought coverage with vancomycin per pharmacological dosing as well as hydroxyzine 3 times a day 50 mg.  We will provide Percocet for pain relief as well as Tylenol.  He has Tylenol for fever.  Have GI prophylaxis Protonix and Lovenox for DVT prophylaxis as well as frequent turning and realizing restroom.      Allergic reaction to tattoo ink  Hydroxyzine 50 mg Q 8 p.o. monitor closely      Hypertension  Patients blood pressure range in the last 24 hours was: No data recorded.The patient's inpatient anti-hypertensive regimen is listed below:  Current Antihypertensives  valsartan tablet 160 mg, 2 times daily, Oral    Plan  - BP is controlled, no changes needed to their regimen  - Continue home medications as written    Bipolar 2 disorder  We will continue his current home medicines including his lithium as he is well-controlled on the these        VTE Risk Mitigation (From admission, onward)           Ordered     enoxaparin injection 40 mg  Daily         10/11/24 1415     IP VTE LOW RISK PATIENT  Once         10/11/24 1415     Place sequential  compression device  Until discontinued         10/11/24 1419                                    Mike Castro MD  Department of Hospital Medicine  [facility]

## 2024-10-11 NOTE — HPI
The patient is a 53-year-old known to me from clinic.  With a history of high blood pressure and bipolar disorder.  Recently got a tattoo to his lower extremity.  For proximally two days the area became red and inflamed started having blistering with pustules and crusting.  He got once in the emergency room and initially got antibiotics in worsened he was seen in my clinic today was given additional antibiotics he has since had an extension of the redness and has had fever.  He is admitted to the hospital for IV antibiotic therapy.  Currently says the pain is controlled just some swelling.  No nausea or vomiting no shortness a breath no chest pain at this time no altered mental status.

## 2024-10-11 NOTE — PLAN OF CARE
Problem: Adult Inpatient Plan of Care  Goal: Plan of Care Review  Outcome: Progressing  Goal: Patient-Specific Goal (Individualized)  Outcome: Progressing  Goal: Absence of Hospital-Acquired Illness or Injury  Outcome: Progressing  Goal: Optimal Comfort and Wellbeing  Outcome: Progressing  Goal: Readiness for Transition of Care  Outcome: Progressing     Problem: Infection  Goal: Absence of Infection Signs and Symptoms  Outcome: Progressing     Problem: Pain Acute  Goal: Optimal Pain Control and Function  Outcome: Progressing     Problem: Comorbidity Management  Goal: Maintenance of Behavioral Health Symptom Control  Outcome: Progressing  Goal: Blood Pressure in Desired Range  Outcome: Progressing     Problem: Skin or Soft Tissue Infection  Goal: Absence of Infection Signs and Symptoms  Outcome: Progressing     Problem: Obstructive Sleep Apnea Risk or Actual  Goal: Unobstructed Breathing During Sleep  Outcome: Progressing

## 2024-10-12 LAB
ALBUMIN SERPL-MCNC: 3.3 G/DL (ref 3.5–5)
ALBUMIN/GLOB SERPL: 1 RATIO (ref 1.1–2)
ALP SERPL-CCNC: 56 UNIT/L (ref 40–150)
ALT SERPL-CCNC: 20 UNIT/L (ref 0–55)
ANION GAP SERPL CALC-SCNC: 6 MEQ/L
AST SERPL-CCNC: 15 UNIT/L (ref 5–34)
BASOPHILS # BLD AUTO: 0.03 X10(3)/MCL
BASOPHILS NFR BLD AUTO: 0.5 %
BILIRUB SERPL-MCNC: 0.4 MG/DL
BUN SERPL-MCNC: 6 MG/DL (ref 8.4–25.7)
CALCIUM SERPL-MCNC: 9 MG/DL (ref 8.4–10.2)
CHLORIDE SERPL-SCNC: 108 MMOL/L (ref 98–107)
CO2 SERPL-SCNC: 26 MMOL/L (ref 22–29)
CREAT SERPL-MCNC: 0.79 MG/DL (ref 0.73–1.18)
CREAT/UREA NIT SERPL: 8
EOSINOPHIL # BLD AUTO: 0.33 X10(3)/MCL (ref 0–0.9)
EOSINOPHIL NFR BLD AUTO: 5.6 %
ERYTHROCYTE [DISTWIDTH] IN BLOOD BY AUTOMATED COUNT: 13.4 % (ref 11.5–17)
GFR SERPLBLD CREATININE-BSD FMLA CKD-EPI: >60 ML/MIN/1.73/M2
GLOBULIN SER-MCNC: 3.2 GM/DL (ref 2.4–3.5)
GLUCOSE SERPL-MCNC: 95 MG/DL (ref 74–100)
HCT VFR BLD AUTO: 40.3 % (ref 42–52)
HGB BLD-MCNC: 13.4 G/DL (ref 14–18)
IMM GRANULOCYTES # BLD AUTO: 0.03 X10(3)/MCL (ref 0–0.04)
IMM GRANULOCYTES NFR BLD AUTO: 0.5 %
LYMPHOCYTES # BLD AUTO: 1.45 X10(3)/MCL (ref 0.6–4.6)
LYMPHOCYTES NFR BLD AUTO: 24.8 %
MAGNESIUM SERPL-MCNC: 2.1 MG/DL (ref 1.6–2.6)
MCH RBC QN AUTO: 30.6 PG (ref 27–31)
MCHC RBC AUTO-ENTMCNC: 33.3 G/DL (ref 33–36)
MCV RBC AUTO: 92 FL (ref 80–94)
MONOCYTES # BLD AUTO: 0.64 X10(3)/MCL (ref 0.1–1.3)
MONOCYTES NFR BLD AUTO: 10.9 %
NEUTROPHILS # BLD AUTO: 3.37 X10(3)/MCL (ref 2.1–9.2)
NEUTROPHILS NFR BLD AUTO: 57.7 %
PLATELET # BLD AUTO: 254 X10(3)/MCL (ref 130–400)
PMV BLD AUTO: 8.8 FL (ref 7.4–10.4)
POTASSIUM SERPL-SCNC: 4.3 MMOL/L (ref 3.5–5.1)
PROT SERPL-MCNC: 6.5 GM/DL (ref 6.4–8.3)
RBC # BLD AUTO: 4.38 X10(6)/MCL (ref 4.7–6.1)
SODIUM SERPL-SCNC: 140 MMOL/L (ref 136–145)
VANCOMYCIN TROUGH SERPL-MCNC: 12.7 UG/ML (ref 15–20)
WBC # BLD AUTO: 5.85 X10(3)/MCL (ref 4.5–11.5)

## 2024-10-12 PROCEDURE — 83735 ASSAY OF MAGNESIUM: CPT | Performed by: FAMILY MEDICINE

## 2024-10-12 PROCEDURE — 25000003 PHARM REV CODE 250: Performed by: FAMILY MEDICINE

## 2024-10-12 PROCEDURE — 85025 COMPLETE CBC W/AUTO DIFF WBC: CPT | Performed by: FAMILY MEDICINE

## 2024-10-12 PROCEDURE — 80202 ASSAY OF VANCOMYCIN: CPT | Performed by: FAMILY MEDICINE

## 2024-10-12 PROCEDURE — 11000001 HC ACUTE MED/SURG PRIVATE ROOM

## 2024-10-12 PROCEDURE — 80053 COMPREHEN METABOLIC PANEL: CPT | Performed by: FAMILY MEDICINE

## 2024-10-12 PROCEDURE — 63600175 PHARM REV CODE 636 W HCPCS: Performed by: FAMILY MEDICINE

## 2024-10-12 PROCEDURE — 36415 COLL VENOUS BLD VENIPUNCTURE: CPT | Performed by: FAMILY MEDICINE

## 2024-10-12 RX ADMIN — HYDROXYZINE HYDROCHLORIDE 50 MG: 25 TABLET, FILM COATED ORAL at 04:10

## 2024-10-12 RX ADMIN — HYDROXYZINE HYDROCHLORIDE 50 MG: 25 TABLET, FILM COATED ORAL at 09:10

## 2024-10-12 RX ADMIN — HYDROCHLOROTHIAZIDE 25 MG: 25 TABLET ORAL at 09:10

## 2024-10-12 RX ADMIN — ARIPIPRAZOLE 10 MG: 5 TABLET ORAL at 09:10

## 2024-10-12 RX ADMIN — ENOXAPARIN SODIUM 40 MG: 40 INJECTION SUBCUTANEOUS at 04:10

## 2024-10-12 RX ADMIN — ACETAMINOPHEN 650 MG: 325 TABLET, FILM COATED ORAL at 03:10

## 2024-10-12 RX ADMIN — VALSARTAN 160 MG: 80 TABLET ORAL at 09:10

## 2024-10-12 RX ADMIN — LITHIUM CARBONATE 900 MG: 300 TABLET, FILM COATED, EXTENDED RELEASE ORAL at 09:10

## 2024-10-12 RX ADMIN — VANCOMYCIN HYDROCHLORIDE 1500 MG: 1.5 INJECTION, POWDER, LYOPHILIZED, FOR SOLUTION INTRAVENOUS at 09:10

## 2024-10-12 RX ADMIN — DULOXETINE HYDROCHLORIDE 60 MG: 60 CAPSULE, DELAYED RELEASE ORAL at 06:10

## 2024-10-12 RX ADMIN — VANCOMYCIN HYDROCHLORIDE 1500 MG: 1.5 INJECTION, POWDER, LYOPHILIZED, FOR SOLUTION INTRAVENOUS at 02:10

## 2024-10-12 RX ADMIN — PANTOPRAZOLE SODIUM 40 MG: 40 TABLET, DELAYED RELEASE ORAL at 09:10

## 2024-10-12 RX ADMIN — ACETAMINOPHEN 650 MG: 325 TABLET, FILM COATED ORAL at 07:10

## 2024-10-13 LAB
ALBUMIN SERPL-MCNC: 3.2 G/DL (ref 3.5–5)
ALBUMIN/GLOB SERPL: 1 RATIO (ref 1.1–2)
ALP SERPL-CCNC: 57 UNIT/L (ref 40–150)
ALT SERPL-CCNC: 21 UNIT/L (ref 0–55)
ANION GAP SERPL CALC-SCNC: 7 MEQ/L
AST SERPL-CCNC: 14 UNIT/L (ref 5–34)
BASOPHILS # BLD AUTO: 0.04 X10(3)/MCL
BASOPHILS NFR BLD AUTO: 0.6 %
BILIRUB SERPL-MCNC: 0.3 MG/DL
BUN SERPL-MCNC: 7 MG/DL (ref 8.4–25.7)
CALCIUM SERPL-MCNC: 8.9 MG/DL (ref 8.4–10.2)
CHLORIDE SERPL-SCNC: 106 MMOL/L (ref 98–107)
CO2 SERPL-SCNC: 26 MMOL/L (ref 22–29)
CREAT SERPL-MCNC: 0.73 MG/DL (ref 0.73–1.18)
CREAT/UREA NIT SERPL: 10
EOSINOPHIL # BLD AUTO: 0.32 X10(3)/MCL (ref 0–0.9)
EOSINOPHIL NFR BLD AUTO: 4.9 %
ERYTHROCYTE [DISTWIDTH] IN BLOOD BY AUTOMATED COUNT: 13.2 % (ref 11.5–17)
GFR SERPLBLD CREATININE-BSD FMLA CKD-EPI: >60 ML/MIN/1.73/M2
GLOBULIN SER-MCNC: 3.1 GM/DL (ref 2.4–3.5)
GLUCOSE SERPL-MCNC: 117 MG/DL (ref 74–100)
HCT VFR BLD AUTO: 40.5 % (ref 42–52)
HGB BLD-MCNC: 13.5 G/DL (ref 14–18)
IMM GRANULOCYTES # BLD AUTO: 0.06 X10(3)/MCL (ref 0–0.04)
IMM GRANULOCYTES NFR BLD AUTO: 0.9 %
LYMPHOCYTES # BLD AUTO: 1.55 X10(3)/MCL (ref 0.6–4.6)
LYMPHOCYTES NFR BLD AUTO: 24 %
MAGNESIUM SERPL-MCNC: 2.1 MG/DL (ref 1.6–2.6)
MCH RBC QN AUTO: 30.6 PG (ref 27–31)
MCHC RBC AUTO-ENTMCNC: 33.3 G/DL (ref 33–36)
MCV RBC AUTO: 91.8 FL (ref 80–94)
MONOCYTES # BLD AUTO: 0.54 X10(3)/MCL (ref 0.1–1.3)
MONOCYTES NFR BLD AUTO: 8.3 %
NEUTROPHILS # BLD AUTO: 3.96 X10(3)/MCL (ref 2.1–9.2)
NEUTROPHILS NFR BLD AUTO: 61.3 %
PHOSPHATE SERPL-MCNC: 2.3 MG/DL (ref 2.3–4.7)
PLATELET # BLD AUTO: 266 X10(3)/MCL (ref 130–400)
PMV BLD AUTO: 9 FL (ref 7.4–10.4)
POTASSIUM SERPL-SCNC: 4 MMOL/L (ref 3.5–5.1)
PROT SERPL-MCNC: 6.3 GM/DL (ref 6.4–8.3)
RBC # BLD AUTO: 4.41 X10(6)/MCL (ref 4.7–6.1)
SODIUM SERPL-SCNC: 139 MMOL/L (ref 136–145)
WBC # BLD AUTO: 6.47 X10(3)/MCL (ref 4.5–11.5)

## 2024-10-13 PROCEDURE — 84100 ASSAY OF PHOSPHORUS: CPT | Performed by: INTERNAL MEDICINE

## 2024-10-13 PROCEDURE — 36415 COLL VENOUS BLD VENIPUNCTURE: CPT | Performed by: FAMILY MEDICINE

## 2024-10-13 PROCEDURE — 63600175 PHARM REV CODE 636 W HCPCS: Performed by: FAMILY MEDICINE

## 2024-10-13 PROCEDURE — 25000003 PHARM REV CODE 250: Performed by: FAMILY MEDICINE

## 2024-10-13 PROCEDURE — 85025 COMPLETE CBC W/AUTO DIFF WBC: CPT | Performed by: FAMILY MEDICINE

## 2024-10-13 PROCEDURE — 80053 COMPREHEN METABOLIC PANEL: CPT | Performed by: FAMILY MEDICINE

## 2024-10-13 PROCEDURE — 25000003 PHARM REV CODE 250: Performed by: INTERNAL MEDICINE

## 2024-10-13 PROCEDURE — 83735 ASSAY OF MAGNESIUM: CPT | Performed by: FAMILY MEDICINE

## 2024-10-13 PROCEDURE — 11000001 HC ACUTE MED/SURG PRIVATE ROOM

## 2024-10-13 RX ORDER — MUPIROCIN 20 MG/G
OINTMENT TOPICAL 2 TIMES DAILY
Status: DISCONTINUED | OUTPATIENT
Start: 2024-10-13 | End: 2024-10-15 | Stop reason: HOSPADM

## 2024-10-13 RX ORDER — MUPIROCIN 20 MG/G
OINTMENT TOPICAL 2 TIMES DAILY
Status: DISCONTINUED | OUTPATIENT
Start: 2024-10-13 | End: 2024-10-13

## 2024-10-13 RX ADMIN — HYDROCHLOROTHIAZIDE 25 MG: 25 TABLET ORAL at 10:10

## 2024-10-13 RX ADMIN — ACETAMINOPHEN 650 MG: 325 TABLET, FILM COATED ORAL at 06:10

## 2024-10-13 RX ADMIN — HYDROXYZINE HYDROCHLORIDE 50 MG: 25 TABLET, FILM COATED ORAL at 04:10

## 2024-10-13 RX ADMIN — PANTOPRAZOLE SODIUM 40 MG: 40 TABLET, DELAYED RELEASE ORAL at 10:10

## 2024-10-13 RX ADMIN — MUPIROCIN 1 G: 20 OINTMENT TOPICAL at 06:10

## 2024-10-13 RX ADMIN — VALSARTAN 160 MG: 80 TABLET ORAL at 10:10

## 2024-10-13 RX ADMIN — HYDROXYZINE HYDROCHLORIDE 50 MG: 25 TABLET, FILM COATED ORAL at 10:10

## 2024-10-13 RX ADMIN — HYDROXYZINE HYDROCHLORIDE 50 MG: 25 TABLET, FILM COATED ORAL at 09:10

## 2024-10-13 RX ADMIN — ARIPIPRAZOLE 10 MG: 5 TABLET ORAL at 09:10

## 2024-10-13 RX ADMIN — VANCOMYCIN HYDROCHLORIDE 1500 MG: 1.5 INJECTION, POWDER, LYOPHILIZED, FOR SOLUTION INTRAVENOUS at 10:10

## 2024-10-13 RX ADMIN — DULOXETINE HYDROCHLORIDE 60 MG: 60 CAPSULE, DELAYED RELEASE ORAL at 06:10

## 2024-10-13 RX ADMIN — VANCOMYCIN HYDROCHLORIDE 1500 MG: 1.5 INJECTION, POWDER, LYOPHILIZED, FOR SOLUTION INTRAVENOUS at 09:10

## 2024-10-13 RX ADMIN — LITHIUM CARBONATE 900 MG: 300 TABLET, FILM COATED, EXTENDED RELEASE ORAL at 09:10

## 2024-10-13 RX ADMIN — ENOXAPARIN SODIUM 40 MG: 40 INJECTION SUBCUTANEOUS at 04:10

## 2024-10-13 RX ADMIN — VANCOMYCIN HYDROCHLORIDE 1500 MG: 1.5 INJECTION, POWDER, LYOPHILIZED, FOR SOLUTION INTRAVENOUS at 03:10

## 2024-10-13 RX ADMIN — ACETAMINOPHEN 650 MG: 325 TABLET, FILM COATED ORAL at 10:10

## 2024-10-13 NOTE — PROGRESS NOTES
[facility]                     [address] [facility] [address]    PATIENT NAME:       TOYA COLEY  YOB: 1970  CSN:                [id number]   MRN:                [id number]  ADMIT DATE:         10/11/2024 13:38:00  PHYSICIAN:          Timi Amador MD                            PROGRESS NOTE    DATE:      SUBJECTIVE:  The patient was admitted because of cellulitis of his left leg,   which has red high pustules and it was after the patient had a tattoo done and   he has gone to the same tattoo place where he had gone before, but it was   extensive and Dr. Castro admitted the patient for IV antibiotics and the patient   is feeling much better though the pain is still there, especially when he   walks.    OBJECTIVE:  VITAL SIGNS:  Are as follows; 104/66 blood pressure, 95% O2 sat, 97   temperature, 85 pulse.  HEENT:  Head normocephalic.  Pupils bilaterally reactive and equal in size.    Mild conjunctival pallor.  No scleral icterus.  NECK:  Trachea is midline.  CHEST:  Has good bilateral air entry.  CVS:  First and second heart sounds are heard normally without any murmurs.  ABDOMEN:  Soft and nontender.  EXTREMITIES:  Devoid of any edema, cyanosis, or clubbing.    LABS AND INVESTIGATIONS:  WBC 5.85, hemoglobin 13.4, hematocrit 40.3, MCV 92.0.    Chemistry; sodium 140, potassium 4.3, chloride 108, BUN 6, creatinine 0.79, GFR   more than 60.    IMPRESSION:    1. Cellulitis of the left lower extremity, status post tattooing with erythema   and pustules.  2. History of basal cell carcinoma.  3. History of bipolar disorder.  4. History of hypertension.  5. History of sleep apnea, on CPAP.    PLAN:  Continue to elevate the leg.  Continue with the IV antibiotics with the   vancomycin.  Monitor the renal function.  Continue with his home medications.    DVT prophylaxis with Lovenox.  GI prophylaxis with proton pump  inhibitors.    Pleasure taking care of Mr. Rosaura BRYANT during his stay at [facility] on the third floor.        ______________________________  MD SOILA Oleary/MINI  DD:  10/12/2024  Time:  05:12PM  DT:  10/12/2024  Time:  07:36PM  Job #:  [id number]      PROGRESS NOTE

## 2024-10-14 LAB
ALBUMIN SERPL-MCNC: 3.3 G/DL (ref 3.5–5)
ALBUMIN/GLOB SERPL: 1 RATIO (ref 1.1–2)
ALP SERPL-CCNC: 58 UNIT/L (ref 40–150)
ALT SERPL-CCNC: 21 UNIT/L (ref 0–55)
ANION GAP SERPL CALC-SCNC: 8 MEQ/L
AST SERPL-CCNC: 13 UNIT/L (ref 5–34)
BACTERIA BLD CULT: NORMAL
BACTERIA BLD CULT: NORMAL
BASOPHILS # BLD AUTO: 0.06 X10(3)/MCL
BASOPHILS NFR BLD AUTO: 0.8 %
BILIRUB SERPL-MCNC: 0.3 MG/DL
BUN SERPL-MCNC: 11 MG/DL (ref 8.4–25.7)
CALCIUM SERPL-MCNC: 9.2 MG/DL (ref 8.4–10.2)
CHLORIDE SERPL-SCNC: 105 MMOL/L (ref 98–107)
CO2 SERPL-SCNC: 26 MMOL/L (ref 22–29)
CREAT SERPL-MCNC: 0.74 MG/DL (ref 0.73–1.18)
CREAT/UREA NIT SERPL: 15
EOSINOPHIL # BLD AUTO: 0.33 X10(3)/MCL (ref 0–0.9)
EOSINOPHIL NFR BLD AUTO: 4.2 %
ERYTHROCYTE [DISTWIDTH] IN BLOOD BY AUTOMATED COUNT: 13.2 % (ref 11.5–17)
GFR SERPLBLD CREATININE-BSD FMLA CKD-EPI: >60 ML/MIN/1.73/M2
GLOBULIN SER-MCNC: 3.2 GM/DL (ref 2.4–3.5)
GLUCOSE SERPL-MCNC: 125 MG/DL (ref 74–100)
HCT VFR BLD AUTO: 41.1 % (ref 42–52)
HGB BLD-MCNC: 14 G/DL (ref 14–18)
IMM GRANULOCYTES # BLD AUTO: 0.19 X10(3)/MCL (ref 0–0.04)
IMM GRANULOCYTES NFR BLD AUTO: 2.4 %
LYMPHOCYTES # BLD AUTO: 1.81 X10(3)/MCL (ref 0.6–4.6)
LYMPHOCYTES NFR BLD AUTO: 23.2 %
MAGNESIUM SERPL-MCNC: 2.1 MG/DL (ref 1.6–2.6)
MCH RBC QN AUTO: 31.2 PG (ref 27–31)
MCHC RBC AUTO-ENTMCNC: 34.1 G/DL (ref 33–36)
MCV RBC AUTO: 91.5 FL (ref 80–94)
MONOCYTES # BLD AUTO: 0.56 X10(3)/MCL (ref 0.1–1.3)
MONOCYTES NFR BLD AUTO: 7.2 %
NEUTROPHILS # BLD AUTO: 4.86 X10(3)/MCL (ref 2.1–9.2)
NEUTROPHILS NFR BLD AUTO: 62.2 %
PHOSPHATE SERPL-MCNC: 3.1 MG/DL (ref 2.3–4.7)
PLATELET # BLD AUTO: 260 X10(3)/MCL (ref 130–400)
PMV BLD AUTO: 8.7 FL (ref 7.4–10.4)
POTASSIUM SERPL-SCNC: 4 MMOL/L (ref 3.5–5.1)
PROT SERPL-MCNC: 6.5 GM/DL (ref 6.4–8.3)
RBC # BLD AUTO: 4.49 X10(6)/MCL (ref 4.7–6.1)
SODIUM SERPL-SCNC: 139 MMOL/L (ref 136–145)
VANCOMYCIN TROUGH SERPL-MCNC: 14.6 UG/ML (ref 15–20)
WBC # BLD AUTO: 7.81 X10(3)/MCL (ref 4.5–11.5)

## 2024-10-14 PROCEDURE — 94761 N-INVAS EAR/PLS OXIMETRY MLT: CPT

## 2024-10-14 PROCEDURE — 25000003 PHARM REV CODE 250: Performed by: INTERNAL MEDICINE

## 2024-10-14 PROCEDURE — 80202 ASSAY OF VANCOMYCIN: CPT | Performed by: FAMILY MEDICINE

## 2024-10-14 PROCEDURE — 84100 ASSAY OF PHOSPHORUS: CPT | Performed by: INTERNAL MEDICINE

## 2024-10-14 PROCEDURE — 11000001 HC ACUTE MED/SURG PRIVATE ROOM

## 2024-10-14 PROCEDURE — 36415 COLL VENOUS BLD VENIPUNCTURE: CPT | Performed by: FAMILY MEDICINE

## 2024-10-14 PROCEDURE — 85025 COMPLETE CBC W/AUTO DIFF WBC: CPT | Performed by: FAMILY MEDICINE

## 2024-10-14 PROCEDURE — 83735 ASSAY OF MAGNESIUM: CPT | Performed by: FAMILY MEDICINE

## 2024-10-14 PROCEDURE — 25000003 PHARM REV CODE 250: Performed by: FAMILY MEDICINE

## 2024-10-14 PROCEDURE — 63600175 PHARM REV CODE 636 W HCPCS: Performed by: FAMILY MEDICINE

## 2024-10-14 PROCEDURE — 80053 COMPREHEN METABOLIC PANEL: CPT | Performed by: FAMILY MEDICINE

## 2024-10-14 RX ADMIN — ARIPIPRAZOLE 10 MG: 5 TABLET ORAL at 08:10

## 2024-10-14 RX ADMIN — HYDROCHLOROTHIAZIDE 25 MG: 25 TABLET ORAL at 09:10

## 2024-10-14 RX ADMIN — PANTOPRAZOLE SODIUM 40 MG: 40 TABLET, DELAYED RELEASE ORAL at 09:10

## 2024-10-14 RX ADMIN — VANCOMYCIN HYDROCHLORIDE 1500 MG: 1.5 INJECTION, POWDER, LYOPHILIZED, FOR SOLUTION INTRAVENOUS at 09:10

## 2024-10-14 RX ADMIN — DULOXETINE HYDROCHLORIDE 60 MG: 60 CAPSULE, DELAYED RELEASE ORAL at 06:10

## 2024-10-14 RX ADMIN — ENOXAPARIN SODIUM 40 MG: 40 INJECTION SUBCUTANEOUS at 05:10

## 2024-10-14 RX ADMIN — HYDROXYZINE HYDROCHLORIDE 50 MG: 25 TABLET, FILM COATED ORAL at 02:10

## 2024-10-14 RX ADMIN — VANCOMYCIN HYDROCHLORIDE 1500 MG: 1.5 INJECTION, POWDER, LYOPHILIZED, FOR SOLUTION INTRAVENOUS at 04:10

## 2024-10-14 RX ADMIN — MUPIROCIN 1 G: 20 OINTMENT TOPICAL at 08:10

## 2024-10-14 RX ADMIN — VANCOMYCIN HYDROCHLORIDE 1500 MG: 1.5 INJECTION, POWDER, LYOPHILIZED, FOR SOLUTION INTRAVENOUS at 12:10

## 2024-10-14 RX ADMIN — HYDROXYZINE HYDROCHLORIDE 50 MG: 25 TABLET, FILM COATED ORAL at 09:10

## 2024-10-14 RX ADMIN — MUPIROCIN: 20 OINTMENT TOPICAL at 09:10

## 2024-10-14 RX ADMIN — HYDROXYZINE HYDROCHLORIDE 50 MG: 25 TABLET, FILM COATED ORAL at 08:10

## 2024-10-14 RX ADMIN — ACETAMINOPHEN 650 MG: 325 TABLET, FILM COATED ORAL at 06:10

## 2024-10-14 RX ADMIN — LITHIUM CARBONATE 900 MG: 300 TABLET, FILM COATED, EXTENDED RELEASE ORAL at 08:10

## 2024-10-14 RX ADMIN — VALSARTAN 160 MG: 80 TABLET ORAL at 09:10

## 2024-10-14 NOTE — HOSPITAL COURSE
The patient is a 53-year-old known to me from clinic. With a history of high blood pressure and bipolar disorder. Recently got a tattoo to his lower extremity. For proximally two days the area became red and inflamed started having blistering with pustules and crusting. He got once in the emergency room and initially got antibiotics in worsened he was seen in my clinic today was given additional antibiotics he has since had an extension of the redness and has had fever. He is admitted to the hospital for IV antibiotic therapy. Currently says the pain is controlled just some swelling. No nausea or vomiting no shortness a breath no chest pain at this time no altered mental status. Patient has shown significant improvement over the weekend he has remained afebrile.  He is eating well good urination good bowel movements.  No shortness a breath chest pains.  Redness has decreased significantly with resolution of the pustules.  He still has some redness, but now minimal. persistent pain is minimal and well-controlled there is some itching.

## 2024-10-14 NOTE — PROGRESS NOTES
[facility]  Progress Note    Patient Name: Austin Kaplan  MRN: [id number]  Patient Class: IP- Inpatient   Admission Date: 10/11/2024  Length of Stay: 3 days  Attending Physician: Mike Castro MD  Primary Care Provider: Mike Castro MD        Subjective:     Principal Problem:Cellulitis of leg        HPI:  The patient is a 53-year-old known to me from clinic.  With a history of high blood pressure and bipolar disorder.  Recently got a tattoo to his lower extremity.  For proximally two days the area became red and inflamed started having blistering with pustules and crusting.  He got once in the emergency room and initially got antibiotics in worsened he was seen in my clinic today was given additional antibiotics he has since had an extension of the redness and has had fever.  He is admitted to the hospital for IV antibiotic therapy.  Currently says the pain is controlled just some swelling.  No nausea or vomiting no shortness a breath no chest pain at this time no altered mental status.    Overview/Hospital Course:  Patient has shown significant improvement over the weekend he has remained afebrile.  He is eating well good urination good bowel movements.  No shortness a breath chest pains.  Redness has decreased significantly with resolution of the pustules.  He still has some redness persistent pain is well-controlled there is some itching.    Past Medical History:   Diagnosis Date    Basal cell carcinoma     Bipolar disorder     HTN (hypertension)     Sleep apnea     CPAP       Past Surgical History:   Procedure Laterality Date    COLONOSCOPY N/A 8/3/2023    Procedure: COLONOSCOPY;  Surgeon: Guille Francis MD;  Location: [facility];  Service: Endoscopy;  Laterality: N/A;  DIVERTICULOSIS OF THE SIGMOID; NORMAL ILEUM; GRADE 2 INTERNAL HEMORRHOIDS, RECTAL POLYP      COLONOSCOPY, WITH POLYPECTOMY USING SNARE N/A 8/3/2023    Procedure: COLONOSCOPY,  WITH POLYPECTOMY USING SNARE;  Surgeon: Guille Francis MD;  Location: [facility] ENDO;  Service: Endoscopy;  Laterality: N/A;  RECTAL POLYP, 7CM IN RECTUM    EXCISION OF LESION Left 4/17/2023    Procedure: EXCISION, LESION (Scalp leison left post);  Surgeon: Guille Francis MD;  Location: [facility] OR;  Service: General;  Laterality: Left;    INGUINAL HERNIA REPAIR Right     TONSILLECTOMY      TYMPANOSTOMY TUBE PLACEMENT         Review of patient's allergies indicates:   Allergen Reactions    Peg-electrolyte soln Hives and Itching    Venlafaxine      Other reaction(s): High blood pressure       No current facility-administered medications on file prior to encounter.     Current Outpatient Medications on File Prior to Encounter   Medication Sig    amoxicillin (AMOXIL) 875 MG tablet Take 1 tablet (875 mg total) by mouth every 12 (twelve) hours. for 10 days    ARIPiprazole (ABILIFY) 10 MG Tab Take 10 mg by mouth every evening.    DULoxetine (CYMBALTA) 60 MG capsule Take 1 capsule by mouth every morning.    lithium (ESKALITH) 450 MG TbSR Take 900 mg by mouth every evening.    sulfamethoxazole-trimethoprim 800-160mg (BACTRIM DS) 800-160 mg Tab Take 2 tablets by mouth 2 (two) times daily. for 7 days    triamcinolone acetonide 0.025% (KENALOG) 0.025 % cream Apply topically 2 (two) times daily. for 7 days    valsartan-hydrochlorothiazide (DIOVAN-HCT) 160-25 mg per tablet Take 1 tablet by mouth every morning.    multivitamin (THERAGRAN) per tablet Take 1 tablet by mouth every morning. (Patient not taking: Reported on 10/8/2024)     Family History       Problem Relation (Age of Onset)    Diabetes Father    Heart attack Mother    Hypertension Mother          Tobacco Use    Smoking status: Former     Current packs/day: 1.00     Types: Cigarettes    Smokeless tobacco: Former     Types: Chew   Substance and Sexual Activity    Alcohol use: Not Currently     Comment: Socially    Drug use: Not Currently     Types: Marijuana    Sexual  activity: Not Currently     Review of Systems   Constitutional:  Positive for fever. Negative for activity change and fatigue.   HENT: Negative.     Eyes: Negative.    Respiratory: Negative.     Cardiovascular: Negative.    Gastrointestinal: Negative.    Endocrine: Negative.    Genitourinary: Negative.    Musculoskeletal: Negative.    Skin:         Redness over the right lower extremity with pustules crusting erythema.  1+ swelling   Neurological: Negative.    Hematological: Negative.    Psychiatric/Behavioral: Negative.       Objective:     Vital Signs (Most Recent):  Temp: 97.8 °F (36.6 °C) (10/14/24 0715)  Pulse: 91 (10/14/24 0715)  Resp: 18 (10/13/24 2000)  BP: 126/74 (10/14/24 0715)  SpO2: 96 % (10/14/24 0715) Vital Signs (24h Range):  Temp:  [97.4 °F (36.3 °C)-98.3 °F (36.8 °C)] 97.8 °F (36.6 °C)  Pulse:  [90-96] 91  Resp:  [18] 18  SpO2:  [95 %-96 %] 96 %  BP: (106-137)/(72-91) 126/74     Weight: 118.8 kg (261 lb 14.5 oz)  Body mass index is 37.58 kg/m².     Physical Exam  Constitutional:       Appearance: He is obese.   HENT:      Head: Normocephalic and atraumatic.      Right Ear: Tympanic membrane normal.      Left Ear: Tympanic membrane normal.      Nose: Nose normal.      Mouth/Throat:      Mouth: Mucous membranes are moist.      Pharynx: Oropharynx is clear.   Eyes:      Extraocular Movements: Extraocular movements intact.      Pupils: Pupils are equal, round, and reactive to light.   Cardiovascular:      Rate and Rhythm: Normal rate and regular rhythm.      Heart sounds: No murmur heard.     No gallop.   Pulmonary:      Effort: Pulmonary effort is normal.      Breath sounds: Normal breath sounds.   Abdominal:      General: Abdomen is flat. Bowel sounds are normal.      Palpations: Abdomen is soft.   Musculoskeletal:         General: Normal range of motion.      Cervical back: Normal range of motion.   Skin:     General: Skin is warm.      Capillary Refill: Capillary refill takes less than 2 seconds.       Comments: Decreased erythema no pustules.  Still some redness directly around tattoo   Neurological:      General: No focal deficit present.      Mental Status: He is alert and oriented to person, place, and time.   Psychiatric:         Mood and Affect: Mood normal.         Behavior: Behavior normal.              CRANIAL NERVES     CN III, IV, VI   Pupils are equal, round, and reactive to light.       Significant Labs: All pertinent labs within the past 24 hours have been reviewed.    Significant Imaging: I have reviewed all pertinent imaging results/findings within the past 24 hours.    Assessment/Plan:      Continue iv abx x 1 day then likely d/c on po doxycycline

## 2024-10-14 NOTE — SUBJECTIVE & OBJECTIVE
Past Medical History:   Diagnosis Date    Basal cell carcinoma     Bipolar disorder     HTN (hypertension)     Sleep apnea     CPAP       Past Surgical History:   Procedure Laterality Date    COLONOSCOPY N/A 8/3/2023    Procedure: COLONOSCOPY;  Surgeon: Guille Francis MD;  Location: [facility] ENDO;  Service: Endoscopy;  Laterality: N/A;  DIVERTICULOSIS OF THE SIGMOID; NORMAL ILEUM; GRADE 2 INTERNAL HEMORRHOIDS, RECTAL POLYP      COLONOSCOPY, WITH POLYPECTOMY USING SNARE N/A 8/3/2023    Procedure: COLONOSCOPY, WITH POLYPECTOMY USING SNARE;  Surgeon: Guille Francis MD;  Location: [facility] ENDO;  Service: Endoscopy;  Laterality: N/A;  RECTAL POLYP, 7CM IN RECTUM    EXCISION OF LESION Left 4/17/2023    Procedure: EXCISION, LESION (Scalp leison left post);  Surgeon: Guille Francis MD;  Location: [facility] OR;  Service: General;  Laterality: Left;    INGUINAL HERNIA REPAIR Right     TONSILLECTOMY      TYMPANOSTOMY TUBE PLACEMENT         Review of patient's allergies indicates:   Allergen Reactions    Peg-electrolyte soln Hives and Itching    Venlafaxine      Other reaction(s): High blood pressure       No current facility-administered medications on file prior to encounter.     Current Outpatient Medications on File Prior to Encounter   Medication Sig    amoxicillin (AMOXIL) 875 MG tablet Take 1 tablet (875 mg total) by mouth every 12 (twelve) hours. for 10 days    ARIPiprazole (ABILIFY) 10 MG Tab Take 10 mg by mouth every evening.    DULoxetine (CYMBALTA) 60 MG capsule Take 1 capsule by mouth every morning.    lithium (ESKALITH) 450 MG TbSR Take 900 mg by mouth every evening.    sulfamethoxazole-trimethoprim 800-160mg (BACTRIM DS) 800-160 mg Tab Take 2 tablets by mouth 2 (two) times daily. for 7 days    triamcinolone acetonide 0.025% (KENALOG) 0.025 % cream Apply topically 2 (two) times daily. for 7 days    valsartan-hydrochlorothiazide (DIOVAN-HCT) 160-25 mg per tablet Take 1 tablet by mouth every morning.     multivitamin (THERAGRAN) per tablet Take 1 tablet by mouth every morning. (Patient not taking: Reported on 10/8/2024)     Family History       Problem Relation (Age of Onset)    Diabetes Father    Heart attack Mother    Hypertension Mother          Tobacco Use    Smoking status: Former     Current packs/day: 1.00     Types: Cigarettes    Smokeless tobacco: Former     Types: Chew   Substance and Sexual Activity    Alcohol use: Not Currently     Comment: Socially    Drug use: Not Currently     Types: Marijuana    Sexual activity: Not Currently     Review of Systems   Constitutional:  Positive for fever. Negative for activity change and fatigue.   HENT: Negative.     Eyes: Negative.    Respiratory: Negative.     Cardiovascular: Negative.    Gastrointestinal: Negative.    Endocrine: Negative.    Genitourinary: Negative.    Musculoskeletal: Negative.    Skin:         Redness over the right lower extremity with pustules crusting erythema.  1+ swelling   Neurological: Negative.    Hematological: Negative.    Psychiatric/Behavioral: Negative.       Objective:     Vital Signs (Most Recent):  Temp: 97.8 °F (36.6 °C) (10/14/24 0715)  Pulse: 91 (10/14/24 0715)  Resp: 18 (10/13/24 2000)  BP: 126/74 (10/14/24 0715)  SpO2: 96 % (10/14/24 0715) Vital Signs (24h Range):  Temp:  [97.4 °F (36.3 °C)-98.3 °F (36.8 °C)] 97.8 °F (36.6 °C)  Pulse:  [90-96] 91  Resp:  [18] 18  SpO2:  [95 %-96 %] 96 %  BP: (106-137)/(72-91) 126/74     Weight: 118.8 kg (261 lb 14.5 oz)  Body mass index is 37.58 kg/m².     Physical Exam  Constitutional:       Appearance: He is obese.   HENT:      Head: Normocephalic and atraumatic.      Right Ear: Tympanic membrane normal.      Left Ear: Tympanic membrane normal.      Nose: Nose normal.      Mouth/Throat:      Mouth: Mucous membranes are moist.      Pharynx: Oropharynx is clear.   Eyes:      Extraocular Movements: Extraocular movements intact.      Pupils: Pupils are equal, round, and reactive to light.    Cardiovascular:      Rate and Rhythm: Normal rate and regular rhythm.      Heart sounds: No murmur heard.     No gallop.   Pulmonary:      Effort: Pulmonary effort is normal.      Breath sounds: Normal breath sounds.   Abdominal:      General: Abdomen is flat. Bowel sounds are normal.      Palpations: Abdomen is soft.   Musculoskeletal:         General: Normal range of motion.      Cervical back: Normal range of motion.   Skin:     General: Skin is warm.      Capillary Refill: Capillary refill takes less than 2 seconds.      Comments: Decreased erythema no pustules.  Still some redness directly around tattoo   Neurological:      General: No focal deficit present.      Mental Status: He is alert and oriented to person, place, and time.   Psychiatric:         Mood and Affect: Mood normal.         Behavior: Behavior normal.              CRANIAL NERVES     CN III, IV, VI   Pupils are equal, round, and reactive to light.       Significant Labs: All pertinent labs within the past 24 hours have been reviewed.    Significant Imaging: I have reviewed all pertinent imaging results/findings within the past 24 hours.

## 2024-10-14 NOTE — PROGRESS NOTES
[facility]                     [address] [address]    PATIENT NAME:       TOYA COLEY  YOB: 1970  CSN:                [id number]   MRN:                [id number]  ADMIT DATE:         10/11/2024 13:38:00  PHYSICIAN:          Timi Amador MD                            PROGRESS NOTE    DATE:      CODE STATUS:  Full code.    SUBJECTIVE:  The patient was visited in the room and he is in good spirits.  His   cellulitis in the lower extremity is much better.  The white pustules are   nearly disappeared.  He was complaining of some dryness, so I said, I will give   him some Bactroban ointment, which he can apply there for soothing it and it   will also act as local bactericidal and he would like to do that.    OBJECTIVE:  VITAL SIGNS:  As follows; afebrile, 92% O2 sat, 106/72, blood   pressure, 91 pulse.  HEENT:  Head normocephalic.  Pupils bilaterally reactive to light and equal in   size.  Mild conjunctival pallor.  No scleral icterus.  NECK:  Trachea is midline.  CHEST:  Good bilateral air entry.  CVS:  First and second heart sounds are heard normally without any murmurs.  ABDOMEN:  Soft, nontender.  EXTREMITIES:  Devoid of any edema, cyanosis, or clubbing.  The cellulitis in the   left lower extremity where he had recent tattoo is much better.    LABS AND INVESTIGATIONS:  White cell count 6.47, hemoglobin 13.5, hematocrit   40.5, platelet count is adequate.  Sodium 139, potassium 4.0, chloride 106, BUN   7, creatinine 0.73, GFR of more than 60.  Glucose was 117.    IMPRESSION:    1. Cellulitis of the left lower extremity status post tattooing much better.    White pustules are all gone, local Bactroban ointment given to be applied b.i.d.  2. History of bipolar disorder.  3. History of basal cell carcinoma.  4. History of hypertension.  5. History of sleep apnea, on CPAP.    PLAN:  Continue the present line  of treatment.  Bactroban ointment to be applied   b.i.d.  Elevate the left.  Continue with vancomycin.  Monitor the BUN and   creatinine.  Continue with the home medications.  DVT prophylaxis with Lovenox.    GI prophylaxis with proton pump inhibitors.    Pleasure taking care of Mr. Austin Kaplan during his stay at [facility] on the 3rd floor.        ______________________________  Timi Amador MD    SS/MINI  DD:  10/13/2024  Time:  06:23PM  DT:  10/13/2024  Time:  09:02PM  Job #:  [id number]      PROGRESS NOTE

## 2024-10-15 VITALS
OXYGEN SATURATION: 96 % | RESPIRATION RATE: 18 BRPM | TEMPERATURE: 99 F | WEIGHT: 261.94 LBS | BODY MASS INDEX: 37.5 KG/M2 | SYSTOLIC BLOOD PRESSURE: 118 MMHG | HEIGHT: 70 IN | DIASTOLIC BLOOD PRESSURE: 76 MMHG | HEART RATE: 86 BPM

## 2024-10-15 PROCEDURE — 94761 N-INVAS EAR/PLS OXIMETRY MLT: CPT

## 2024-10-15 PROCEDURE — 63600175 PHARM REV CODE 636 W HCPCS: Performed by: FAMILY MEDICINE

## 2024-10-15 PROCEDURE — 25000003 PHARM REV CODE 250: Performed by: FAMILY MEDICINE

## 2024-10-15 RX ORDER — DOXYCYCLINE HYCLATE 100 MG
100 TABLET ORAL 2 TIMES DAILY
Qty: 20 TABLET | Refills: 0 | Status: SHIPPED | OUTPATIENT
Start: 2024-10-15

## 2024-10-15 RX ORDER — HYDROXYZINE HYDROCHLORIDE 50 MG/1
50 TABLET, FILM COATED ORAL 3 TIMES DAILY
Qty: 30 TABLET | Refills: 0 | Status: SHIPPED | OUTPATIENT
Start: 2024-10-15

## 2024-10-15 RX ORDER — HYDROCODONE BITARTRATE AND ACETAMINOPHEN 5; 325 MG/1; MG/1
1 TABLET ORAL EVERY 6 HOURS PRN
Qty: 20 TABLET | Refills: 0 | Status: SHIPPED | OUTPATIENT
Start: 2024-10-15

## 2024-10-15 RX ADMIN — DULOXETINE HYDROCHLORIDE 60 MG: 60 CAPSULE, DELAYED RELEASE ORAL at 06:10

## 2024-10-15 RX ADMIN — VANCOMYCIN HYDROCHLORIDE 1500 MG: 1.5 INJECTION, POWDER, LYOPHILIZED, FOR SOLUTION INTRAVENOUS at 04:10

## 2024-10-15 NOTE — DISCHARGE SUMMARY
[facility]  Hospital Medicine  Discharge Summary      Patient Name: Austin Kaplan  MRN: [id number]  DEIRDRE: [id number]  Patient Class: IP- Inpatient  Admission Date: 10/11/2024  Hospital Length of Stay: 4 days  Discharge Date and Time:  10/15/2024 7:37 AM  Attending Physician: Mike Castro MD   Discharging Provider: Mike Castro MD  Primary Care Provider: Mike Castro MD    Primary Care Team: Networked reference to record PCT     HPI:   The patient is a 53-year-old known to me from clinic.  With a history of high blood pressure and bipolar disorder.  Recently got a tattoo to his lower extremity.  For proximally two days the area became red and inflamed started having blistering with pustules and crusting.  He got once in the emergency room and initially got antibiotics in worsened he was seen in my clinic today was given additional antibiotics he has since had an extension of the redness and has had fever.  He is admitted to the hospital for IV antibiotic therapy.  Currently says the pain is controlled just some swelling.  No nausea or vomiting no shortness a breath no chest pain at this time no altered mental status.    * No surgery found *      Hospital Course:   The patient is a 53-year-old known to me from clinic. With a history of high blood pressure and bipolar disorder. Recently got a tattoo to his lower extremity. For proximally two days the area became red and inflamed started having blistering with pustules and crusting. He got once in the emergency room and initially got antibiotics in worsened he was seen in my clinic today was given additional antibiotics he has since had an extension of the redness and has had fever. He is admitted to the hospital for IV antibiotic therapy. Currently says the pain is controlled just some swelling. No nausea or vomiting no shortness a breath no chest pain at this time no altered mental status. Patient has shown  significant improvement over the weekend he has remained afebrile.  He is eating well good urination good bowel movements.  No shortness a breath chest pains.  Redness has decreased significantly with resolution of the pustules.  He still has some redness, but now minimal. persistent pain is minimal and well-controlled there is some itching.     Goals of Care Treatment Preferences:  Code Status: Full Code      SDOH Screening:  The patient was screened for utility difficulties, food insecurity, transport difficulties, housing insecurity, and interpersonal safety and there were no concerns identified this admission.     Consults:   Consults (From admission, onward)          Status Ordering Provider     Pharmacy to dose Vancomycin consult  Once        Provider:  (Not yet assigned)    Acknowledged LJ NOYOLA

## 2024-10-15 NOTE — ASSESSMENT & PLAN NOTE
This may be equal contributions of infection and allergic reaction doxycycline 100mg po bid, as well as hydroxyzine 3 times a day 50 mg.   Percocet for pain relief as well as Tylenol.  He has Tylenol for fever.    He is doing much better and I feel like he will be able to be discharged on doxycycline  
  This may be equal contributions of infection and allergic reaction sought coverage with vancomycin per pharmacological dosing as well as hydroxyzine 3 times a day 50 mg.   Percocet for pain relief as well as Tylenol.  He has Tylenol for fever.    Have GI prophylaxis Protonix and Lovenox for DVT prophylaxis   He is doing much better and I feel like with 1 more day of IV antibiotics he will be able to be discharged tomorrow on doxycycline  
Hydroxyzine 50 mg Q 8 p.o. monitor closely    
I have ordered blood culture CBC chemistry.  This may be equal contributions of infection and allergic reaction sought coverage with vancomycin per pharmacological dosing as well as hydroxyzine 3 times a day 50 mg.  We will provide Percocet for pain relief as well as Tylenol.  He has Tylenol for fever.  Have GI prophylaxis Protonix and Lovenox for DVT prophylaxis as well as frequent turning and realizing restroom.    
Patients blood pressure range in the last 24 hours was: BP  Min: 104/61  Max: 149/72.The patient's inpatient anti-hypertensive regimen is listed below:  Current Antihypertensives  valsartan tablet 160 mg, Daily, Oral  hydroCHLOROthiazide tablet 25 mg, Daily, Oral    Plan  - BP is controlled, no changes needed to their regimen  - Continue home medications as written  
Patients blood pressure range in the last 24 hours was: BP  Min: 97/56  Max: 149/72.The patient's inpatient anti-hypertensive regimen is listed below:  Current Antihypertensives  valsartan tablet 160 mg, Daily, Oral  hydroCHLOROthiazide tablet 25 mg, Daily, Oral    Plan  - BP is controlled, no changes needed to their regimen  - Continue home medications as written  
Patients blood pressure range in the last 24 hours was: No data recorded.The patient's inpatient anti-hypertensive regimen is listed below:  Current Antihypertensives  valsartan tablet 160 mg, 2 times daily, Oral    Plan  - BP is controlled, no changes needed to their regimen  - Continue home medications as written  
We will continue his current home medicines including his lithium as he is well-controlled on the these    
68

## 2024-10-16 LAB — BACTERIA BLD CULT: NORMAL

## 2024-10-23 ENCOUNTER — PATIENT OUTREACH (OUTPATIENT)
Dept: ADMINISTRATIVE | Facility: CLINIC | Age: 54
End: 2024-10-23
Payer: COMMERCIAL

## 2024-10-23 NOTE — PROGRESS NOTES
C3 nurse attempted to contact Austin BRYANT Rosaura  for a TCC post hospital discharge follow up call. No answer. Left voicemail with callback information. The patient has a scheduled HOSFU appointment with Mike Castro,10/17/2024 @8:30am.

## 2024-10-23 NOTE — PROGRESS NOTES
C3 nurse spoke with Austin Kaplan  for a TCC post hospital discharge follow up call. The patient had a scheduled HOSFU appointment with Mike Castro,10/17/2024 @8:30am.

## 2024-11-07 ENCOUNTER — HOSPITAL ENCOUNTER (EMERGENCY)
Facility: HOSPITAL | Age: 54
Discharge: HOME | End: 2024-11-07
Attending: INTERNAL MEDICINE
Payer: COMMERCIAL

## 2024-11-07 VITALS
BODY MASS INDEX: 37.97 KG/M2 | HEIGHT: 71 IN | DIASTOLIC BLOOD PRESSURE: 77 MMHG | SYSTOLIC BLOOD PRESSURE: 138 MMHG | HEART RATE: 88 BPM | RESPIRATION RATE: 17 BRPM | OXYGEN SATURATION: 98 % | TEMPERATURE: 99 F | WEIGHT: 271.19 LBS

## 2024-11-07 DIAGNOSIS — R10.9 ABDOMINAL PAIN, UNSPECIFIED ABDOMINAL LOCATION: Primary | ICD-10-CM

## 2024-11-07 DIAGNOSIS — K52.9 GASTROENTERITIS: ICD-10-CM

## 2024-11-07 LAB
ALBUMIN SERPL-MCNC: 3.8 G/DL (ref 3.5–5)
ALBUMIN/GLOB SERPL: 1.2 RATIO (ref 1.1–2)
ALP SERPL-CCNC: 57 UNIT/L (ref 40–150)
ALT SERPL-CCNC: 20 UNIT/L (ref 0–55)
ANION GAP SERPL CALC-SCNC: 9 MEQ/L
AST SERPL-CCNC: 22 UNIT/L (ref 5–34)
BACTERIA #/AREA URNS AUTO: ABNORMAL /HPF
BASOPHILS # BLD AUTO: 0.02 X10(3)/MCL
BASOPHILS NFR BLD AUTO: 0.3 %
BILIRUB SERPL-MCNC: 1.3 MG/DL
BILIRUB UR QL STRIP.AUTO: NEGATIVE
BUN SERPL-MCNC: 14.3 MG/DL (ref 8.4–25.7)
CALCIUM SERPL-MCNC: 8.6 MG/DL (ref 8.4–10.2)
CHLORIDE SERPL-SCNC: 100 MMOL/L (ref 98–107)
CLARITY UR: CLEAR
CO2 SERPL-SCNC: 26 MMOL/L (ref 22–29)
COLOR UR AUTO: YELLOW
CREAT SERPL-MCNC: 0.79 MG/DL (ref 0.72–1.25)
CREAT/UREA NIT SERPL: 18
EOSINOPHIL # BLD AUTO: 0.06 X10(3)/MCL (ref 0–0.9)
EOSINOPHIL NFR BLD AUTO: 0.8 %
ERYTHROCYTE [DISTWIDTH] IN BLOOD BY AUTOMATED COUNT: 13.7 % (ref 11.5–17)
GFR SERPLBLD CREATININE-BSD FMLA CKD-EPI: >60 ML/MIN/1.73/M2
GLOBULIN SER-MCNC: 3.2 GM/DL (ref 2.4–3.5)
GLUCOSE SERPL-MCNC: 111 MG/DL (ref 74–100)
GLUCOSE UR QL STRIP: NORMAL
HCT VFR BLD AUTO: 48.4 % (ref 42–52)
HGB BLD-MCNC: 16.7 G/DL (ref 14–18)
HGB UR QL STRIP: NEGATIVE
HOLD SPECIMEN: NORMAL
HYALINE CASTS #/AREA URNS LPF: ABNORMAL /LPF
IMM GRANULOCYTES # BLD AUTO: 0.03 X10(3)/MCL (ref 0–0.04)
IMM GRANULOCYTES NFR BLD AUTO: 0.4 %
KETONES UR QL STRIP: NEGATIVE
LACTATE SERPL-SCNC: 1.1 MMOL/L (ref 0.5–2.2)
LEUKOCYTE ESTERASE UR QL STRIP: NEGATIVE
LYMPHOCYTES # BLD AUTO: 1.27 X10(3)/MCL (ref 0.6–4.6)
LYMPHOCYTES NFR BLD AUTO: 16.6 %
MCH RBC QN AUTO: 31.5 PG (ref 27–31)
MCHC RBC AUTO-ENTMCNC: 34.5 G/DL (ref 33–36)
MCV RBC AUTO: 91.3 FL (ref 80–94)
MONOCYTES # BLD AUTO: 0.52 X10(3)/MCL (ref 0.1–1.3)
MONOCYTES NFR BLD AUTO: 6.8 %
MUCOUS THREADS URNS QL MICRO: ABNORMAL /LPF
NEUTROPHILS # BLD AUTO: 5.74 X10(3)/MCL (ref 2.1–9.2)
NEUTROPHILS NFR BLD AUTO: 75.1 %
NITRITE UR QL STRIP: NEGATIVE
NRBC BLD AUTO-RTO: 0 %
PH UR STRIP: 6 [PH]
PLATELET # BLD AUTO: 199 X10(3)/MCL (ref 130–400)
PMV BLD AUTO: 9.3 FL (ref 7.4–10.4)
POTASSIUM SERPL-SCNC: 3.7 MMOL/L (ref 3.5–5.1)
PROT SERPL-MCNC: 7 GM/DL (ref 6.4–8.3)
PROT UR QL STRIP: ABNORMAL
RBC # BLD AUTO: 5.3 X10(6)/MCL (ref 4.7–6.1)
RBC #/AREA URNS AUTO: ABNORMAL /HPF
SODIUM SERPL-SCNC: 135 MMOL/L (ref 136–145)
SP GR UR STRIP.AUTO: 1.02 (ref 1–1.03)
SQUAMOUS #/AREA URNS LPF: ABNORMAL /HPF
UROBILINOGEN UR STRIP-ACNC: NORMAL
WBC # BLD AUTO: 7.64 X10(3)/MCL (ref 4.5–11.5)
WBC #/AREA URNS AUTO: ABNORMAL /HPF

## 2024-11-07 PROCEDURE — 85025 COMPLETE CBC W/AUTO DIFF WBC: CPT

## 2024-11-07 PROCEDURE — 80053 COMPREHEN METABOLIC PANEL: CPT

## 2024-11-07 PROCEDURE — 63600175 PHARM REV CODE 636 W HCPCS

## 2024-11-07 PROCEDURE — 83605 ASSAY OF LACTIC ACID: CPT

## 2024-11-07 PROCEDURE — 81001 URINALYSIS AUTO W/SCOPE: CPT

## 2024-11-07 PROCEDURE — 99284 EMERGENCY DEPT VISIT MOD MDM: CPT | Mod: 25

## 2024-11-07 PROCEDURE — 87040 BLOOD CULTURE FOR BACTERIA: CPT

## 2024-11-07 PROCEDURE — 96372 THER/PROPH/DIAG INJ SC/IM: CPT

## 2024-11-07 RX ORDER — DICYCLOMINE HYDROCHLORIDE 10 MG/1
10 CAPSULE ORAL 3 TIMES DAILY PRN
Qty: 15 CAPSULE | Refills: 0 | Status: SHIPPED | OUTPATIENT
Start: 2024-11-07

## 2024-11-07 RX ORDER — DICYCLOMINE HYDROCHLORIDE 10 MG/ML
20 INJECTION INTRAMUSCULAR
Status: COMPLETED | OUTPATIENT
Start: 2024-11-07 | End: 2024-11-07

## 2024-11-07 RX ADMIN — DICYCLOMINE HYDROCHLORIDE 20 MG: 10 INJECTION, SOLUTION INTRAMUSCULAR at 06:11

## 2024-11-07 NOTE — ED PROVIDER NOTES
Encounter Date: 11/7/2024       History     Chief Complaint   Patient presents with    Abdominal Pain     Pt c/o generalized abd pain that x 8 hours ago now c/o RLQ abd pain with N/V    Nausea    Vomiting     53 year old male with hx of HTN, bipolar disorder presents due to worsening abdominal pain that began as generalized pain and has since migrated to RLQ. Patient reports that the pain began 8 hours prior, and that he woke up that morning feeling unwell and had an episode of vomiting. Patient has also had diarrhea during this time that is watery in consistency. Patient has not eaten anything all day and has slept most of the day to avoid the pain. Has been taking tylenol every 4 hours for pain and the pain  has been 8/10 in intensity. In addition, also reports having a fever earlier in the day. Denies sick contacts or suspicious ingestions. Denies chest pain, SOB, urinary symptoms, hematochezia.    The history is provided by the patient.     Review of patient's allergies indicates:   Allergen Reactions    Peg-electrolyte soln Hives and Itching    Venlafaxine      Other reaction(s): High blood pressure     Past Medical History:   Diagnosis Date    Basal cell carcinoma     Bipolar disorder     HTN (hypertension)     Sleep apnea     CPAP     Past Surgical History:   Procedure Laterality Date    COLONOSCOPY N/A 8/3/2023    Procedure: COLONOSCOPY;  Surgeon: Guille Francis MD;  Location: [facility];  Service: Endoscopy;  Laterality: N/A;  DIVERTICULOSIS OF THE SIGMOID; NORMAL ILEUM; GRADE 2 INTERNAL HEMORRHOIDS, RECTAL POLYP      COLONOSCOPY, WITH POLYPECTOMY USING SNARE N/A 8/3/2023    Procedure: COLONOSCOPY, WITH POLYPECTOMY USING SNARE;  Surgeon: Guille Francis MD;  Location: [facility] ENDO;  Service: Endoscopy;  Laterality: N/A;  RECTAL POLYP, 7CM IN RECTUM    EXCISION OF LESION Left 4/17/2023    Procedure: EXCISION, LESION (Scalp leison left post);  Surgeon: Guille Francis MD;  Location: [facility];  Service:  General;  Laterality: Left;    INGUINAL HERNIA REPAIR Right     TONSILLECTOMY      TYMPANOSTOMY TUBE PLACEMENT       Family History   Problem Relation Name Age of Onset    Hypertension Mother      Heart attack Mother      Diabetes Father       Social History     Tobacco Use    Smoking status: Former     Current packs/day: 1.00     Types: Cigarettes    Smokeless tobacco: Former     Types: Chew   Substance Use Topics    Alcohol use: Not Currently     Comment: Socially    Drug use: Not Currently     Types: Marijuana     Review of Systems   Constitutional:  Positive for appetite change, chills and fever.   Respiratory:  Negative for cough, chest tightness, shortness of breath and wheezing.    Cardiovascular:  Negative for chest pain.   Gastrointestinal:  Positive for abdominal pain, diarrhea, nausea and vomiting. Negative for blood in stool and constipation.   Genitourinary:  Negative for decreased urine volume.   All other systems reviewed and are negative.      Physical Exam     Initial Vitals   BP Pulse Resp Temp SpO2   11/07/24 0455 11/07/24 0455 11/07/24 0455 11/07/24 0515 11/07/24 0455   131/86 100 (!) 22 99.2 °F (37.3 °C) 96 %      MAP       --                Physical Exam    Nursing note and vitals reviewed.  Constitutional: He appears well-developed and well-nourished. No distress.   HENT:   Head: Normocephalic and atraumatic. Mouth/Throat: Oropharynx is clear and moist.   Eyes: EOM are normal. No scleral icterus.   Neck: No JVD present.   Normal range of motion.  Cardiovascular:  Normal rate, regular rhythm, normal heart sounds and intact distal pulses.           Abdominal: Bowel sounds are normal. He exhibits distension. There is abdominal tenderness.   Patient with +Barron's sign, +Mcburneys, + Psoas sign; negative rovsing sign   Musculoskeletal:         General: No edema. Normal range of motion.      Cervical back: Normal range of motion.     Neurological: He is alert and oriented to person, place, and  time. GCS score is 15. GCS eye subscore is 4. GCS verbal subscore is 5. GCS motor subscore is 6.   Skin: Skin is warm. No erythema.   Psychiatric: Thought content normal.         ED Course   Procedures  Labs Reviewed   COMPREHENSIVE METABOLIC PANEL - Abnormal       Result Value    Sodium 135 (*)     Potassium 3.7      Chloride 100      CO2 26      Glucose 111 (*)     Blood Urea Nitrogen 14.3      Creatinine 0.79      Calcium 8.6      Protein Total 7.0      Albumin 3.8      Globulin 3.2      Albumin/Globulin Ratio 1.2      Bilirubin Total 1.3      ALP 57      ALT 20      AST 22      eGFR >60      Anion Gap 9.0      BUN/Creatinine Ratio 18     URINALYSIS, REFLEX TO URINE CULTURE - Abnormal    Color, UA Yellow      Appearance, UA Clear      Specific Gravity, UA 1.021      pH, UA 6.0      Protein, UA 1+ (*)     Glucose, UA Normal      Ketones, UA Negative      Blood, UA Negative      Bilirubin, UA Negative      Urobilinogen, UA Normal      Nitrites, UA Negative      Leukocyte Esterase, UA Negative      RBC, UA 0-5      WBC, UA 0-5      Bacteria, UA None Seen      Squamous Epithelial Cells, UA None Seen      Mucous, UA Trace (*)     Hyaline Casts, UA None Seen     CBC WITH DIFFERENTIAL - Abnormal    WBC 7.64      RBC 5.30      Hgb 16.7      Hct 48.4      MCV 91.3      MCH 31.5 (*)     MCHC 34.5      RDW 13.7      Platelet 199      MPV 9.3      Neut % 75.1      Lymph % 16.6      Mono % 6.8      Eos % 0.8      Basophil % 0.3      Lymph # 1.27      Neut # 5.74      Mono # 0.52      Eos # 0.06      Baso # 0.02      IG# 0.03      IG% 0.4      NRBC% 0.0     LACTIC ACID, PLASMA - Normal    Lactic Acid Level 1.1     BLOOD CULTURE [facility]   BLOOD CULTURE [facility]   CBC W/ AUTO DIFFERENTIAL    Narrative:     The following orders were created for panel order CBC auto differential.  Procedure                               Abnormality         Status                     ---------                               -----------         ------                      CBC with Differential[[id number]]       Abnormal            Final result                 Please view results for these tests on the individual orders.   EXTRA TUBES    Narrative:     The following orders were created for panel order EXTRA TUBES.  Procedure                               Abnormality         Status                     ---------                               -----------         ------                     Light Blue Top Hold[[id number]]                             In process                 Red Top Hold[[id number]]                                    In process                 Light Green Top Hold[[id number]]                            In process                 Light Green Top Hold[[id number]]                            In process                 Light Green Top Hold[[id number]]                            In process                 Lavender Top Hold[[id number]]                               In process                 Lavender Top Hold[[id number]]                               In process                 Gold Top Hold[[id number]]                                   In process                 Pink Top Hold[[id number]]                                   In process                   Please view results for these tests on the individual orders.   LIGHT BLUE TOP HOLD   RED TOP HOLD   LIGHT GREEN TOP HOLD   LIGHT GREEN TOP HOLD   LIGHT GREEN TOP HOLD   LAVENDER TOP HOLD   LAVENDER TOP HOLD   GOLD TOP HOLD   PINK TOP HOLD          Imaging Results              CT Abdomen Pelvis  Without Contrast (Final result)  Result time 11/07/24 06:38:17      Final result by Dianne Jordan MD (11/07/24 06:38:17)                   Impression:      1. The small bowel and colon are fluid-filled.  Correlate for enterocolitis or ileus.  2. Nonobstructing right nephrolithiasis  3. Hepatic steatosis      Electronically signed by: Dianne Jordan  Date:    11/07/2024  Time:    06:38               Narrative:     EXAMINATION:  CT ABDOMEN PELVIS WITHOUT CONTRAST    CLINICAL HISTORY:  RLQ abdominal pain (Age >= 14y);    TECHNIQUE:  Helically acquired axial images, sagittal and coronal reformations were obtained from the lung bases to the pubic symphysis without the IV administration of contrast.    Automated tube current modulation, weight-based exposure dosing, and/or iterative reconstruction technique utilized to reach lowest reasonably achievable exposure rate.    DLP: 769 mGy*cm    COMPARISON:  CT abdomen pelvis 09/17/2019    FINDINGS:  LUNG BASES: Chronic elevation right hemidiaphragm.    LIVER: The liver is hypodense compatible with steatosis.  There are bilobar fluid attenuation cysts.    BILIARY: Mild distension of the gallbladder.    PANCREAS: No inflammatory change.    SPLEEN: Chronic splenic hypodensities.    ADRENALS: No mass.    KIDNEYS/URETERS: Punctate 2 mm nonobstructing right renal caliceal calculus.    GI TRACT/MESENTERY: Evaluation of the bowel is limited without contrast. Small bowel and colon are fluid-filled.  Small bowel loops measure up to 3.8 cm in diameter.  No focal transition point seen.  The appendix is normal. Colonic diverticulosis without acute inflammatory change.    PERITONEUM: No free fluid.No free air.    LYMPH NODES: No enlarged lymph nodes by size criteria.    VASCULATURE: Mild aortic atherosclerosis.    BLADDER: Normal appearance given degree of distention.    REPRODUCTIVE ORGANS: There are prostate calcifications.    ABDOMINAL WALL: Unremarkable.    BONES: Degenerative change at the lumbosacral spine.                                       Medications   piperacillin-tazobactam (ZOSYN) 4.5 g in D5W 100 mL IVPB (MB+) (has no administration in time range)   dicyclomine injection 20 mg (20 mg Intramuscular Given 11/7/24 0610)     Medical Decision Making  Patient with abd pain and 2/4 SIRS criteria. Patient has exam findings concerning for possible appendicitis. Bed side ultrasound without  acute findings,CT abd/pelvis with fluid in colon and small bowel suggestive of enterocolitis. CBC/CMP, lactate wnl; Marte score 5+, possible appendicitis; obtained blood cultures x2    Amount and/or Complexity of Data Reviewed  Labs: ordered. Decision-making details documented in ED Course.  Radiology: ordered and independent interpretation performed. Decision-making details documented in ED Course.     Details: CT abd pelvis w/o contrast, some evidence of likely enterocolitis    Risk  Prescription drug management.      Additional MDM:   Differential Diagnosis:   Other: The following diagnoses were also considered and will be evaluated: appendicitis, cholecystitis and gastroenteritis.           Attending Attestation:   Physician Attestation Statement for Resident:  As the supervising MD   Physician Attestation Statement: I have personally seen and examined this patient.   I agree with the above history.  -:   As the supervising MD I agree with the above PE.     As the supervising MD I agree with the above treatment, course, plan, and disposition.    I have reviewed and agree with the residents interpretation of the following: lab data.                                        Clinical Impression:  Final diagnoses:  [R10.9] Abdominal pain, unspecified abdominal location (Primary)  [K52.9] Gastroenteritis          ED Disposition Condition    Discharge Stable          ED Prescriptions       Medication Sig Dispense Start Date End Date Auth. Provider    dicyclomine (BENTYL) 10 MG capsule Take 1 capsule (10 mg total) by mouth 3 (three) times daily as needed (cramping). 15 capsule 11/7/2024 -- Bethany Crespo MD          Follow-up Information       Follow up With Specialties Details Why Contact Info    Mike Castro MD Family Medicine Schedule an appointment as soon as possible for a visit  As needed, If symptoms worsen [address] [facility]  [place] [address]  [phone]      [facility] -  Emergency Dept Emergency Medicine  As needed, If symptoms worsen [address] [facility] [address]  [phone]             Bethany Crespo MD  Resident  11/07/24 0650       Bethany Crespo MD  Resident  11/07/24 0653       Jarred Wallace MD  11/07/24 0817

## 2024-11-12 LAB
BACTERIA BLD CULT: NORMAL
BACTERIA BLD CULT: NORMAL

## (undated) DEVICE — PENCIL SMOKE EVAC ROCKER 70MM

## (undated) DEVICE — GLOVE PROTEXIS HYDROGEL SZ6.5

## (undated) DEVICE — KIT SURGICAL COLON .25 1.1OZ

## (undated) DEVICE — DRESSING N ADH OIL EMUL 3X3

## (undated) DEVICE — CAP THERMOLITE ADULT #TS5110-

## (undated) DEVICE — SUT GUT PL. 4-0 27 FS-2

## (undated) DEVICE — SUT CTD VICRYL 3-0 CR/SH

## (undated) DEVICE — GLOVE PROTEXIS BLUE LATEX 6.5

## (undated) DEVICE — PAD ELECTROSURGICAL SPL W/CORD

## (undated) DEVICE — CLIP LIGATION MEDIUM CLIPS 1

## (undated) DEVICE — Device

## (undated) DEVICE — SUT ETHILON 2-0 FS 18IN BLK

## (undated) DEVICE — GLOVE PROTEXIS LTX 6.5

## (undated) DEVICE — GLOVE PROTEXIS BLUE LATEX 8.5

## (undated) DEVICE — DISSECTOR LIGASURE EXACT 21CM

## (undated) DEVICE — SOL IRR SOD CHL .9% POUR

## (undated) DEVICE — SNARE POLYP STD SNG 7FR

## (undated) DEVICE — GOWN POLY REINF SET SLV XL

## (undated) DEVICE — ADHESIVE DERMABOND ADVANCED

## (undated) DEVICE — SOL IRRI STRL WATER 1000ML

## (undated) DEVICE — GLOVE PROTEXIS HYDROGEL SZ7.5

## (undated) DEVICE — SUT 2/0 27IN PLAIN GUT CT

## (undated) DEVICE — TRAP SUCTION POLYP

## (undated) DEVICE — BLANKET THERMOFLECT 4X7

## (undated) DEVICE — CHLORAPREP W TINT 26ML APPL